# Patient Record
Sex: FEMALE | Race: OTHER | HISPANIC OR LATINO
[De-identification: names, ages, dates, MRNs, and addresses within clinical notes are randomized per-mention and may not be internally consistent; named-entity substitution may affect disease eponyms.]

---

## 2017-12-08 ENCOUNTER — APPOINTMENT (OUTPATIENT)
Dept: OTOLARYNGOLOGY | Facility: CLINIC | Age: 57
End: 2017-12-08

## 2017-12-08 PROBLEM — Z00.00 ENCOUNTER FOR PREVENTIVE HEALTH EXAMINATION: Status: ACTIVE | Noted: 2017-12-08

## 2017-12-29 ENCOUNTER — APPOINTMENT (OUTPATIENT)
Dept: OTOLARYNGOLOGY | Facility: CLINIC | Age: 57
End: 2017-12-29
Payer: MEDICAID

## 2017-12-29 DIAGNOSIS — H61.22 IMPACTED CERUMEN, LEFT EAR: ICD-10-CM

## 2017-12-29 DIAGNOSIS — F20.9 SCHIZOPHRENIA, UNSPECIFIED: ICD-10-CM

## 2017-12-29 DIAGNOSIS — E11.9 TYPE 2 DIABETES MELLITUS W/OUT COMPLICATIONS: ICD-10-CM

## 2017-12-29 DIAGNOSIS — I10 ESSENTIAL (PRIMARY) HYPERTENSION: ICD-10-CM

## 2017-12-29 PROCEDURE — 69210 REMOVE IMPACTED EAR WAX UNI: CPT

## 2017-12-29 PROCEDURE — 99213 OFFICE O/P EST LOW 20 MIN: CPT | Mod: 25

## 2017-12-29 RX ORDER — LORAZEPAM 1 MG/1
1 TABLET ORAL
Refills: 0 | Status: ACTIVE | COMMUNITY

## 2017-12-29 RX ORDER — AMLODIPINE BESYLATE 5 MG/1
5 TABLET ORAL
Refills: 0 | Status: ACTIVE | COMMUNITY

## 2017-12-29 RX ORDER — OLANZAPINE 20 MG/1
20 TABLET, FILM COATED ORAL
Refills: 0 | Status: ACTIVE | COMMUNITY

## 2017-12-29 RX ORDER — DIPHENHYDRAMINE HCL 50 MG/1
50 CAPSULE ORAL
Refills: 0 | Status: ACTIVE | COMMUNITY

## 2017-12-29 RX ORDER — HYDRALAZINE HYDROCHLORIDE 10 MG/1
10 TABLET ORAL
Refills: 0 | Status: ACTIVE | COMMUNITY

## 2017-12-29 RX ORDER — LAMOTRIGINE 200 MG/1
200 TABLET ORAL
Refills: 0 | Status: ACTIVE | COMMUNITY

## 2017-12-29 RX ORDER — PALIPERIDONE 1.5 MG/1
1.5 TABLET, FILM COATED, EXTENDED RELEASE ORAL
Refills: 0 | Status: ACTIVE | COMMUNITY

## 2018-02-12 ENCOUNTER — EMERGENCY (EMERGENCY)
Facility: HOSPITAL | Age: 58
LOS: 0 days | Discharge: HOME | End: 2018-02-13
Attending: EMERGENCY MEDICINE

## 2018-02-12 VITALS
SYSTOLIC BLOOD PRESSURE: 160 MMHG | RESPIRATION RATE: 18 BRPM | TEMPERATURE: 98 F | HEART RATE: 98 BPM | OXYGEN SATURATION: 95 % | DIASTOLIC BLOOD PRESSURE: 74 MMHG

## 2018-02-12 DIAGNOSIS — F20.9 SCHIZOPHRENIA, UNSPECIFIED: ICD-10-CM

## 2018-02-12 DIAGNOSIS — R05 COUGH: ICD-10-CM

## 2018-02-12 DIAGNOSIS — Z88.8 ALLERGY STATUS TO OTHER DRUGS, MEDICAMENTS AND BIOLOGICAL SUBSTANCES STATUS: ICD-10-CM

## 2018-02-12 DIAGNOSIS — E11.9 TYPE 2 DIABETES MELLITUS WITHOUT COMPLICATIONS: ICD-10-CM

## 2018-02-12 DIAGNOSIS — J06.9 ACUTE UPPER RESPIRATORY INFECTION, UNSPECIFIED: ICD-10-CM

## 2018-02-12 DIAGNOSIS — I10 ESSENTIAL (PRIMARY) HYPERTENSION: ICD-10-CM

## 2018-02-13 VITALS
HEART RATE: 90 BPM | RESPIRATION RATE: 17 BRPM | DIASTOLIC BLOOD PRESSURE: 72 MMHG | TEMPERATURE: 98 F | SYSTOLIC BLOOD PRESSURE: 140 MMHG | OXYGEN SATURATION: 98 %

## 2018-02-13 LAB
FLU A RESULT: NEGATIVE — SIGNIFICANT CHANGE UP
FLU A RESULT: NEGATIVE — SIGNIFICANT CHANGE UP
FLUAV AG NPH QL: NEGATIVE — SIGNIFICANT CHANGE UP
FLUBV AG NPH QL: NEGATIVE — SIGNIFICANT CHANGE UP

## 2018-02-13 NOTE — ED PROVIDER NOTE - NS ED ROS FT
Constitutional: see HPI  Cardiac: No chest pain, SOB or edema.  Respiratory: see HPI  GI: No nausea, vomiting, diarrhea or abdominal pain.  : No dysuria, frequency, urgency or hematuria  MS: no pain to back or extremities  Neuro: No headache or weakness. No LOC.  Skin: No skin rash.  Except as documented in the HPI, all other systems are negative.

## 2018-02-13 NOTE — ED PROVIDER NOTE - PROGRESS NOTE DETAILS
flu negative, CXR with no infiltrate, discussed these findings with RN Mat at [facility], will send back to facility. No signs of acute cadiopulmonary disease

## 2018-02-13 NOTE — ED PROVIDER NOTE - PHYSICAL EXAMINATION
VITAL SIGNS: I have reviewed nursing notes and confirm.  CONSTITUTIONAL: Well-developed; well-nourished; in no acute distress.  SKIN: Skin exam is warm and dry, no acute rash.  HEAD: Normocephalic; atraumatic.  EYES: PERRL, EOM intact; conjunctiva and sclera clear.  ENT: clear rhinorrhea, visible post nasal drip, normal appearing pharynx, normal TMs  NECK: Supple; non tender, no adenopathy  CARD: Regular rate and rhythm.  RESP: No wheezes, rales or rhonchi.  ABD: Normal bowel sounds; soft; non-distended; non-tender  EXT: Normal ROM. No clubbing, cyanosis or edema.  NEURO: Alert, oriented. Grossly unremarkable. No focal deficits.  PSYCH: Cooperative, appropriate, occasionally speaks nonsensically but mostly answers questions, denies SI and HI, no apparent hallucinations, psychosis

## 2018-02-13 NOTE — ED PROVIDER NOTE - MEDICAL DECISION MAKING DETAILS
56 yo F, limited historian, here for flu like sx, normal VS and exam in ED and currently no complaints, will check flu swab and CXR as patient is from group facility and would likely need to be isolated if flu positive, will reassess

## 2018-02-13 NOTE — ED PROVIDER NOTE - OBJECTIVE STATEMENT
58 yo F with history of HTN, DM II, schizophrenia, sent from  Psych center after reportedly reporting cough, runny nose, throat pain and fever, though report from  Psych denies fever. In ED, via , patient has no complaints. Does not appear to be disorganized though has poor insight into illness and occasionally speaks nonsensically

## 2018-03-13 ENCOUNTER — EMERGENCY (EMERGENCY)
Facility: HOSPITAL | Age: 58
LOS: 0 days | Discharge: NURSING, REHAB OR LONG-TERM CARE | End: 2018-03-13
Attending: EMERGENCY MEDICINE

## 2018-03-13 VITALS
TEMPERATURE: 98 F | OXYGEN SATURATION: 92 % | DIASTOLIC BLOOD PRESSURE: 89 MMHG | RESPIRATION RATE: 18 BRPM | HEART RATE: 89 BPM | SYSTOLIC BLOOD PRESSURE: 187 MMHG | WEIGHT: 179.9 LBS | HEIGHT: 64 IN

## 2018-03-13 VITALS — TEMPERATURE: 99 F

## 2018-03-13 DIAGNOSIS — Z79.52 LONG TERM (CURRENT) USE OF SYSTEMIC STEROIDS: ICD-10-CM

## 2018-03-13 DIAGNOSIS — F20.9 SCHIZOPHRENIA, UNSPECIFIED: ICD-10-CM

## 2018-03-13 DIAGNOSIS — Z79.84 LONG TERM (CURRENT) USE OF ORAL HYPOGLYCEMIC DRUGS: ICD-10-CM

## 2018-03-13 DIAGNOSIS — E78.5 HYPERLIPIDEMIA, UNSPECIFIED: ICD-10-CM

## 2018-03-13 DIAGNOSIS — Z79.899 OTHER LONG TERM (CURRENT) DRUG THERAPY: ICD-10-CM

## 2018-03-13 DIAGNOSIS — Z79.51 LONG TERM (CURRENT) USE OF INHALED STEROIDS: ICD-10-CM

## 2018-03-13 DIAGNOSIS — Z79.01 LONG TERM (CURRENT) USE OF ANTICOAGULANTS: ICD-10-CM

## 2018-03-13 DIAGNOSIS — F20.3 UNDIFFERENTIATED SCHIZOPHRENIA: ICD-10-CM

## 2018-03-13 DIAGNOSIS — Z88.8 ALLERGY STATUS TO OTHER DRUGS, MEDICAMENTS AND BIOLOGICAL SUBSTANCES STATUS: ICD-10-CM

## 2018-03-13 DIAGNOSIS — I10 ESSENTIAL (PRIMARY) HYPERTENSION: ICD-10-CM

## 2018-03-13 DIAGNOSIS — E11.9 TYPE 2 DIABETES MELLITUS WITHOUT COMPLICATIONS: ICD-10-CM

## 2018-03-13 RX ORDER — LAMOTRIGINE 25 MG/1
0 TABLET, ORALLY DISINTEGRATING ORAL
Qty: 0 | Refills: 0 | COMMUNITY

## 2018-03-13 RX ORDER — OLANZAPINE 15 MG/1
0 TABLET, FILM COATED ORAL
Qty: 0 | Refills: 0 | COMMUNITY

## 2018-03-13 RX ORDER — LACTULOSE 10 G/15ML
20 SOLUTION ORAL
Qty: 0 | Refills: 0 | COMMUNITY

## 2018-03-13 RX ORDER — RISPERIDONE 4 MG/1
0 TABLET ORAL
Qty: 0 | Refills: 0 | COMMUNITY

## 2018-03-13 RX ORDER — SERTRALINE 25 MG/1
2 TABLET, FILM COATED ORAL
Qty: 0 | Refills: 0 | COMMUNITY

## 2018-03-13 RX ORDER — POLYETHYLENE GLYCOL 3350 17 G/17G
0 POWDER, FOR SOLUTION ORAL
Qty: 0 | Refills: 0 | COMMUNITY

## 2018-03-13 RX ORDER — PALIPERIDONE 1.5 MG/1
0 TABLET, EXTENDED RELEASE ORAL
Qty: 0 | Refills: 0 | COMMUNITY

## 2018-03-13 RX ORDER — HYDRALAZINE HCL 50 MG
0 TABLET ORAL
Qty: 0 | Refills: 0 | COMMUNITY

## 2018-03-13 RX ORDER — AMLODIPINE BESYLATE 2.5 MG/1
1 TABLET ORAL
Qty: 0 | Refills: 0 | COMMUNITY

## 2018-03-13 RX ORDER — SITAGLIPTIN 50 MG/1
1 TABLET, FILM COATED ORAL
Qty: 0 | Refills: 0 | COMMUNITY

## 2018-03-13 RX ORDER — CHOLECALCIFEROL (VITAMIN D3) 125 MCG
1 CAPSULE ORAL
Qty: 0 | Refills: 0 | COMMUNITY

## 2018-03-13 NOTE — ED PROVIDER NOTE - PMH
Constipation    Diabetes mellitus    Hyperlipidemia    Hypertension    Orthostatic hypotension    Osteoarthritis    Schizophrenia    Vitamin D deficiency

## 2018-03-13 NOTE — ED BEHAVIORAL HEALTH ASSESSMENT NOTE - DESCRIPTION
Pt was seen and evaluated by ER MD and upon medical clearance was referred for psychiatric evaluation.  Pt was seen and evaluated.  Pt has been calm since arrival up to present time. Pt is not fully cooperative, she was not answering  when triage nurse used  phone access.  Pt seen lying on bed with poor eye contact but was able to verbally respond to questions when asked in [place]ine[place].  Pt was coherent, speech was non- productive.  Pt answers in 1-2 words sentences.  When asked in [place] what her problem, she stated, "No" She was asked if she feels depressed in Hebrew, she stated, "No." when asked in [place] if she hears voices in her head, she answered "No voces." (No voices) and was asked if she has thoughts of wanting to die, she answered, "No quiere morir." (No I don't want to die) and when asked in [place] if she wants to go back to TLR, She answered and nodded. "Si" (yes).  Pt does not pose any substantial risk of harm to self or others at this time and is psychiatrically cleared for discharge back [facility] TLR unit and refer back to [facility] treating psychiatrist continuing follow-up care. Constipation, D.M., HPN, Hyperlipidemia,Osteoarthritis Pt currently disabled and resides at [facility].

## 2018-03-13 NOTE — ED BEHAVIORAL HEALTH ASSESSMENT NOTE - OTHER
Other residents/patients H/o Schizophrenia H/O Schizophrenia Fairly cooperative Neutral did not participate in examination Speaks only in [place] Island[place] Return back to ER PRN

## 2018-03-13 NOTE — ED BEHAVIORAL HEALTH ASSESSMENT NOTE - OTHER PAST PSYCHIATRIC HISTORY (INCLUDE DETAILS REGARDING ONSET, COURSE OF ILLNESS, INPATIENT/OUTPATIENT TREATMENT)
known history of schizophrenia with past psychiatric hospitalizations the last one was at [facility] [place] [facility] and was eventually discharged to [facility] where she has been followed-up by [facility] psychiatrist.

## 2018-03-13 NOTE — ED BEHAVIORAL HEALTH ASSESSMENT NOTE - DETAILS
None available complains of colds as per referral consult from [facility] Case and psychiatric disposition plan

## 2018-03-13 NOTE — ED BEHAVIORAL HEALTH ASSESSMENT NOTE - HPI (INCLUDE ILLNESS QUALITY, SEVERITY, DURATION, TIMING, CONTEXT, MODIFYING FACTORS, ASSOCIATED SIGNS AND SYMPTOMS)
Pt is a 56 y/o  female with known history of schizophrenia with past psychiatric hospitalizations the last one was at [facility] ([facility]) inpatient unit and was eventually discharged to [facility] where she has been followed-up by [facility] psychiatrist maintained on multiple psychotropic medications consisting Risperidone, Lamotrigine, Olanzapine, lorazepam , Sertraline and Paliperidone  IM suspension [id number] and apparently behaviorally stable.  Today, pt was referred to ER from [facility] TLR due to "Pt was complaining of colds and also pt has hallucination," as written on [facility] consult form.  Pt was subsequently referred for psychiatric consultation.

## 2018-03-13 NOTE — ED BEHAVIORAL HEALTH ASSESSMENT NOTE - SUMMARY
Pt is a 56 y/o  female with known history of schizophrenia with past psychiatric hospitalizations the last one was at [facility] ([facility]) inpatient unit and was eventually discharged to [facility] where she has been followed-up by [facility] psychiatrist maintained on multiple psychotropic medications who was BIBA to ER due to "Pt was complaining of colds and also pt has hallucination," as written on [facility] consult form.  Pt presents with negative symptoms such as poor social interaction, fair personal hygiene, Blunted Affect, mood is neutral  with no suicidal or homicidal ideation and appears with anhedonia, thought blocking and poor insight.  No overt delusion and denies hallucination not exhibiting positive symptoms.  Pt meets criteria for Schizophrenia, chronic and does not pose any substantial risk of harm to self or others at this time and is psychiatrically cleared for discharge back [facility] TLR unit and refer back to [facility] treating psychiatrist continuing follow-up care.

## 2018-03-13 NOTE — ED ADULT NURSE NOTE - PMH
Diabetes mellitus    Hypertension    Schizophrenia Constipation    Diabetes mellitus    Hyperlipidemia    Hypertension    Orthostatic hypotension    Osteoarthritis    Schizophrenia    Vitamin D deficiency

## 2018-03-13 NOTE — ED PROVIDER NOTE - ATTENDING CONTRIBUTION TO CARE
[facility] so was sent here. pt at baseline minimally verbal reportedly, on exam s1s2 ctab soft nt/nd, perrl eomi. will discuss with psych

## 2018-03-13 NOTE — ED ADULT NURSE REASSESSMENT NOTE - NS ED NURSE REASSESS COMMENT FT1
Attempted to utilize  phone with no success-pt would not answer  [id number].  Called [facility] 1, spoke with Jhonny RN for more information

## 2018-03-13 NOTE — ED BEHAVIORAL HEALTH ASSESSMENT NOTE - REASON FOR REFERRAL
Pt was BIBA from [facility] due to pt was "Complaining of colds and has hallucination."

## 2018-03-13 NOTE — ED BEHAVIORAL HEALTH ASSESSMENT NOTE - CURRENT MEDICATION
Risperidone, Lamotrigine, Olanzapine, lorazepam , Sertraline and Paliperidone IM Suspension [id number].

## 2018-03-13 NOTE — ED BEHAVIORAL HEALTH ASSESSMENT NOTE - RESIDENCE
[facility] ([facility] (TLR)

## 2018-03-13 NOTE — ED PROVIDER NOTE - OBJECTIVE STATEMENT
58 y/o F prior hx of HTN, anxiety, bipolar, schizophrenia presents with the brief episode of refusal to take her medication per [facility] nurse. Patient also cried at the same time. She is minimally verbal at baseline. No nausea, vomiting. No CP, SOB.

## 2018-05-02 ENCOUNTER — OUTPATIENT (OUTPATIENT)
Dept: OUTPATIENT SERVICES | Facility: HOSPITAL | Age: 58
LOS: 1 days | Discharge: HOME | End: 2018-05-02

## 2018-05-08 ENCOUNTER — EMERGENCY (EMERGENCY)
Facility: HOSPITAL | Age: 58
LOS: 0 days | Discharge: HOME | End: 2018-05-08
Attending: EMERGENCY MEDICINE | Admitting: EMERGENCY MEDICINE

## 2018-05-08 VITALS
HEART RATE: 88 BPM | TEMPERATURE: 96 F | OXYGEN SATURATION: 98 % | SYSTOLIC BLOOD PRESSURE: 137 MMHG | RESPIRATION RATE: 18 BRPM | DIASTOLIC BLOOD PRESSURE: 69 MMHG

## 2018-05-08 DIAGNOSIS — I10 ESSENTIAL (PRIMARY) HYPERTENSION: ICD-10-CM

## 2018-05-08 DIAGNOSIS — F23 BRIEF PSYCHOTIC DISORDER: ICD-10-CM

## 2018-05-08 DIAGNOSIS — R68.83 CHILLS (WITHOUT FEVER): ICD-10-CM

## 2018-05-08 DIAGNOSIS — Z88.8 ALLERGY STATUS TO OTHER DRUGS, MEDICAMENTS AND BIOLOGICAL SUBSTANCES: ICD-10-CM

## 2018-05-08 DIAGNOSIS — E11.9 TYPE 2 DIABETES MELLITUS WITHOUT COMPLICATIONS: ICD-10-CM

## 2018-05-08 DIAGNOSIS — Z79.899 OTHER LONG TERM (CURRENT) DRUG THERAPY: ICD-10-CM

## 2018-05-08 NOTE — ED ADULT TRIAGE NOTE - CHIEF COMPLAINT QUOTE
As per EMS, [place] and told EMS she was cold. Patient states she escaped a locked unit. Patient from [address] [facility]. 1:1 placed on patient.

## 2018-05-08 NOTE — ED PROVIDER NOTE - ATTENDING CONTRIBUTION TO CARE
58 yo F with history of schizophrenia, chronic auditory hallucinations, lives [place] [facility], brought in by EMS after [place], shivering and responding to auditory hallucinations. No SI or HI, no aggressive behavior. The patient was seen by our psych team who obtained collateral information from family and [facility] team who felt that the patient is at her baseline, is generally not a "flight risk" or a behavioral concern. Will return to [place] psych, cleared by Dr. ortega.

## 2018-05-08 NOTE — ED PROVIDER NOTE - OBJECTIVE STATEMENT
58 yo F with PMHx of DM, HTN, HLD, OA and schizophrenia presents to the ED BIB EMS after being found wandering around the beach shivering and responding to auditory hallucinations. Pt is a resident at [facility] and escaped. Pt denies any suicidal or homicidal ideation. Pt denies fever, chills, nausea, vomiting, abdominal pain, diarrhea, headache, dizziness, weakness, chest pain, SOB, back pain, LOC, trauma.

## 2018-05-08 NOTE — ED BEHAVIORAL HEALTH ASSESSMENT NOTE - DESCRIPTION
patient is calm and pleasant Constipation, D.M., HPN, Hyperlipidemia,Osteoarthritis Pt currently resides at [facility]

## 2018-05-08 NOTE — ED PROVIDER NOTE - PHYSICAL EXAMINATION
VITAL SIGNS: I have reviewed nursing notes and confirm.  CONSTITUTIONAL: Well-developed; well-nourished; in no acute distress.  SKIN: Skin exam is warm and dry, no acute rash.  HEAD: Normocephalic; atraumatic.  EYES: Conjunctiva and sclera clear.  ENT: No nasal discharge; airway clear.   NECK: Supple; non tender.  CARD: S1, S2 normal; no murmurs, gallops, or rubs. Regular rate and rhythm.  RESP: No wheezes, rales or rhonchi. Speaking in full sentences.   EXT: Normal ROM.   NEURO: Alert, oriented. Grossly unremarkable. No focal deficits.  PSYCH: Cooperative, appropriate. (+) auditory hallucinations. No suicidal/homicidal ideation.

## 2018-05-08 NOTE — ED BEHAVIORAL HEALTH ASSESSMENT NOTE - CASE SUMMARY
57 year old woman domiciled at [facility] with a past diagnosis of schizophrenia who was found wandering on the beach.  Patient evaluated, chart reviewed, collateral obtained from residence and family.  The patient’s history is notable for past psychiatric hospitalizations including most recent at [facility] ([facility] followed by discharge to [facility] and maintained on multiple antipsychotics including a long acting injectable.  She recently presented to the ED in 3/2018 with much the same MSE notable for negative symptoms of schizophrenia and impaired insight with some thought blocking and thought disorganization.  These signs are of her chronic illness and she is unlikely to achieve full remission.  In the ED under observation, the patient was calm and in behavioral control, denying SI or HI.  At this time, despite her symptomology, she does not appear to be at increased imminent risk to harm herself or others.  Her housing would normally provide the supportive environment necessary to protect against events such as tonight when she has left the residency.  Discharge back to this level of care is appropriate.  Inpatient hospitalization would not likely modify her current risk.

## 2018-05-08 NOTE — ED BEHAVIORAL HEALTH ASSESSMENT NOTE - HPI (INCLUDE ILLNESS QUALITY, SEVERITY, DURATION, TIMING, CONTEXT, MODIFYING FACTORS, ASSOCIATED SIGNS AND SYMPTOMS)
Pt is a 58 y/o  female, domiciled at [facility], with known history of schizophrenia brought in by EMS after she was found on the [place] shivering and she told EMS that she ran away from the hospital. On evaluation with , patient says that she ran away from [facility] for personal reasons, because she doesn't like some woman there. She planned to walk to her mother's house in [place]. She is constricted on interview, lying down on the bed in blankets. She denies AH, VH, PI, SI, HI.       Obtained collateral from [facility] staff member Glenna, who says that Jauna is normally not a behavioral problem. She is compliant with her medications, calm and pleasant. She is not known to run away from [facility]. Also obtained collateral from her sister Jairo, who says that at baseline patient has auditory hallucinations. When she saw her two weeks ago, patient was talking to two little girls (who do not exist). She confirms that patient is not usually a behavioral problem and takes her medications. Jairo lives with their mother in [place]. It was expressed to Jairo that patient will be returned to [facility] and she verbalizes agreement with the plan. [facility] made aware that patient will be returning to them

## 2018-05-08 NOTE — ED BEHAVIORAL HEALTH ASSESSMENT NOTE - CURRENT MEDICATION
Risperidone, Lamotrigine, Olanzapine, lorazepam , Sertraline and Paliperidone IM Suspension [id number].

## 2018-05-08 NOTE — ED BEHAVIORAL HEALTH ASSESSMENT NOTE - REASON FOR REFERRAL
Pt was BIBA from [facility] due to pt was "Complaining of colds and has hallucination." found [place] a beach

## 2018-05-08 NOTE — ED BEHAVIORAL HEALTH ASSESSMENT NOTE - DETAILS
None available complains of colds as per referral consult from [facility] ed

## 2018-05-08 NOTE — ED BEHAVIORAL HEALTH ASSESSMENT NOTE - OTHER
tlr H/o Schizophrenia H/O Schizophrenia Neutral did not participate in examination Speaks only in [place]e Fairly cooperative

## 2018-05-08 NOTE — ED BEHAVIORAL HEALTH ASSESSMENT NOTE - OTHER PAST PSYCHIATRIC HISTORY (INCLUDE DETAILS REGARDING ONSET, COURSE OF ILLNESS, INPATIENT/OUTPATIENT TREATMENT)
known history of schizophrenia with past psychiatric hospitalizations the last one was at [facility] ([facility] inpatient unit and was eventually discharged to [facility] where she has been followed-up by [facility] psychiatrist.

## 2018-05-24 ENCOUNTER — APPOINTMENT (OUTPATIENT)
Dept: UROLOGY | Facility: CLINIC | Age: 58
End: 2018-05-24

## 2018-05-29 ENCOUNTER — APPOINTMENT (OUTPATIENT)
Dept: PODIATRY | Facility: CLINIC | Age: 58
End: 2018-05-29

## 2018-06-15 ENCOUNTER — EMERGENCY (EMERGENCY)
Facility: HOSPITAL | Age: 58
LOS: 1 days | Discharge: HOME | End: 2018-06-15
Attending: EMERGENCY MEDICINE | Admitting: EMERGENCY MEDICINE
Payer: MEDICAID

## 2018-06-15 VITALS
HEART RATE: 95 BPM | SYSTOLIC BLOOD PRESSURE: 172 MMHG | TEMPERATURE: 98 F | RESPIRATION RATE: 18 BRPM | OXYGEN SATURATION: 98 % | DIASTOLIC BLOOD PRESSURE: 99 MMHG

## 2018-06-15 VITALS
RESPIRATION RATE: 16 BRPM | DIASTOLIC BLOOD PRESSURE: 68 MMHG | SYSTOLIC BLOOD PRESSURE: 141 MMHG | HEART RATE: 69 BPM | OXYGEN SATURATION: 97 % | TEMPERATURE: 99 F

## 2018-06-15 DIAGNOSIS — F20.9 SCHIZOPHRENIA, UNSPECIFIED: ICD-10-CM

## 2018-06-15 DIAGNOSIS — I10 ESSENTIAL (PRIMARY) HYPERTENSION: ICD-10-CM
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
## 2018-06-15 DIAGNOSIS — E11.65 TYPE 2 DIABETES MELLITUS WITH HYPERGLYCEMIA: ICD-10-CM

## 2018-06-15 DIAGNOSIS — R41.82 ALTERED MENTAL STATUS, UNSPECIFIED: ICD-10-CM

## 2018-06-15 DIAGNOSIS — E78.5 HYPERLIPIDEMIA, UNSPECIFIED: ICD-10-CM

## 2018-06-15 LAB
ALBUMIN SERPL ELPH-MCNC: 3.5 G/DL — SIGNIFICANT CHANGE UP (ref 3.4–5)
ALP SERPL-CCNC: 113 U/L — SIGNIFICANT CHANGE UP (ref 40–120)
ALT FLD-CCNC: 32 U/L — SIGNIFICANT CHANGE UP (ref 12–42)
ANION GAP SERPL CALC-SCNC: 9 MMOL/L — SIGNIFICANT CHANGE UP (ref 9–16)
APPEARANCE UR: CLEAR — SIGNIFICANT CHANGE UP
AST SERPL-CCNC: 24 U/L — SIGNIFICANT CHANGE UP (ref 15–37)
BILIRUB SERPL-MCNC: 0.2 MG/DL — SIGNIFICANT CHANGE UP (ref 0.2–1.2)
BILIRUB UR-MCNC: NEGATIVE — SIGNIFICANT CHANGE UP
BUN SERPL-MCNC: 40 MG/DL — HIGH (ref 7–23)
CALCIUM SERPL-MCNC: 9.8 MG/DL — SIGNIFICANT CHANGE UP (ref 8.5–10.5)
CHLORIDE SERPL-SCNC: 103 MMOL/L — SIGNIFICANT CHANGE UP (ref 96–108)
CO2 SERPL-SCNC: 25 MMOL/L — SIGNIFICANT CHANGE UP (ref 22–31)
COLOR SPEC: YELLOW — SIGNIFICANT CHANGE UP
CREAT SERPL-MCNC: 1.44 MG/DL — HIGH (ref 0.5–1.3)
DIFF PNL FLD: ABNORMAL
ETHANOL SERPL-MCNC: <3 MG/DL — SIGNIFICANT CHANGE UP
GLUCOSE SERPL-MCNC: 438 MG/DL — HIGH (ref 70–99)
GLUCOSE UR QL: >=1000
HCT VFR BLD CALC: 35.7 % — SIGNIFICANT CHANGE UP (ref 34.5–45)
HGB BLD-MCNC: 11.7 G/DL — SIGNIFICANT CHANGE UP (ref 11.5–15.5)
KETONES UR-MCNC: NEGATIVE — SIGNIFICANT CHANGE UP
LEUKOCYTE ESTERASE UR-ACNC: NEGATIVE — SIGNIFICANT CHANGE UP
MCHC RBC-ENTMCNC: 29 PG — SIGNIFICANT CHANGE UP (ref 27–34)
MCHC RBC-ENTMCNC: 32.8 G/DL — SIGNIFICANT CHANGE UP (ref 32–36)
MCV RBC AUTO: 88.4 FL — SIGNIFICANT CHANGE UP (ref 80–100)
NITRITE UR-MCNC: NEGATIVE — SIGNIFICANT CHANGE UP
PCO2 BLDV: 48 MMHG — SIGNIFICANT CHANGE UP (ref 41–51)
PH BLDV: 7.34 — SIGNIFICANT CHANGE UP (ref 7.32–7.43)
PH UR: 6.5 — SIGNIFICANT CHANGE UP (ref 5–8)
PLATELET # BLD AUTO: 260 K/UL — SIGNIFICANT CHANGE UP (ref 150–400)
PO2 BLDV: 27 MMHG — LOW (ref 35–40)
POTASSIUM SERPL-MCNC: 5 MMOL/L — SIGNIFICANT CHANGE UP (ref 3.5–5.3)
POTASSIUM SERPL-SCNC: 5 MMOL/L — SIGNIFICANT CHANGE UP (ref 3.5–5.3)
PROT SERPL-MCNC: 8.3 G/DL — HIGH (ref 6.4–8.2)
PROT UR-MCNC: ABNORMAL MG/DL
RBC # BLD: 4.04 M/UL — SIGNIFICANT CHANGE UP (ref 3.8–5.2)
RBC # FLD: 12.9 % — SIGNIFICANT CHANGE UP (ref 10.3–16.9)
SAO2 % BLDV: 50 % — SIGNIFICANT CHANGE UP
SODIUM SERPL-SCNC: 137 MMOL/L — SIGNIFICANT CHANGE UP (ref 132–145)
SP GR SPEC: 1.02 — SIGNIFICANT CHANGE UP (ref 1–1.03)
UROBILINOGEN FLD QL: 0.2 E.U./DL — SIGNIFICANT CHANGE UP
WBC # BLD: 9.1 K/UL — SIGNIFICANT CHANGE UP (ref 3.8–10.5)
WBC # FLD AUTO: 9.1 K/UL — SIGNIFICANT CHANGE UP (ref 3.8–10.5)

## 2018-06-15 PROCEDURE — 99284 EMERGENCY DEPT VISIT MOD MDM: CPT

## 2018-06-15 RX ORDER — INSULIN HUMAN 100 [IU]/ML
10 INJECTION, SOLUTION SUBCUTANEOUS ONCE
Qty: 0 | Refills: 0 | Status: COMPLETED | OUTPATIENT
Start: 2018-06-15 | End: 2018-06-15

## 2018-06-15 RX ORDER — SODIUM CHLORIDE 9 MG/ML
1000 INJECTION INTRAMUSCULAR; INTRAVENOUS; SUBCUTANEOUS ONCE
Qty: 0 | Refills: 0 | Status: COMPLETED | OUTPATIENT
Start: 2018-06-15 | End: 2018-06-15

## 2018-06-15 RX ADMIN — SODIUM CHLORIDE 2000 MILLILITER(S): 9 INJECTION INTRAMUSCULAR; INTRAVENOUS; SUBCUTANEOUS at 17:05

## 2018-06-15 RX ADMIN — SODIUM CHLORIDE 2000 MILLILITER(S): 9 INJECTION INTRAMUSCULAR; INTRAVENOUS; SUBCUTANEOUS at 18:13

## 2018-06-15 RX ADMIN — INSULIN HUMAN 10 UNIT(S): 100 INJECTION, SOLUTION SUBCUTANEOUS at 18:11

## 2018-06-15 NOTE — ED ADULT NURSE NOTE - CHPI ED SYMPTOMS NEG
no disorientation/no suicidal/no weakness/no hallucinations/no homicidal/no change in level of consciousness/no agitation/no confusion/no weight loss

## 2018-06-15 NOTE — ED PROVIDER NOTE - PROGRESS NOTE DETAILS
was able to locate patient's residence in [place].  We spoke with them and they are expecting patient.   They state she is known to wander and they are looking to increase her level of housing.  Patient is awake and conversant.  She has no complaints.  She is ambulatory and taking po without difficulty.  Her blood sugar was high but responded to insulin.  She is now ready for discharge.  EMS called to safely transport patient back to her facility.

## 2018-06-15 NOTE — ED PROVIDER NOTE - MEDICAL DECISION MAKING DETAILS
Will treat hyperglycemia. Contacted [facility] and confirm that Alyssa Wilcox is a resident. Patient will be transported home via ambulance.

## 2018-06-15 NOTE — ED ADULT NURSE NOTE - CHIEF COMPLAINT QUOTE
pt found on street crying, and somnolent Rwandan speaking refusing to talk to EMS. in triage pt awake alert and oriented to self and place and month. states she is from a psych facility in [place]. denies pain/headache ambulatory at scene. face is symmetrical speech is clear

## 2018-06-15 NOTE — ED ADULT NURSE NOTE - OBJECTIVE STATEMENT
Pt BIBA, found altered in the street crying, French speaking only. In triage pt is A&O x self, month and place, pt reports she lives in psychiatric facility      t found on street crying, and somnolent French speaking refusing to talk to EMS. in triage pt awake alert and oriented to self and place and month. states she is from a psych facility in [place]. denies pain/headache ambulatory at scene. face is symmetrical speech is clear Pt BIBA, found altered in the street crying, Urdu speaking only. In triage pt is A&O x self, month and place, pt reports she lives in psychiatric facility in [place], does not answer questions related to how she got here. Pt is speaking in complete sentences but random topics, refers to living in [place] with her mother and 'computer screens watching her'. Pt denies any pain currently, no injuries noted. Pt has equal facial symmetry, no facial droop or slurred words, equal strengths and resistance bilaterally. Pt denies any SI/HI, denies any hallucinations.

## 2018-06-15 NOTE — ED ADULT TRIAGE NOTE - CHIEF COMPLAINT QUOTE
pt found on street crying, and somnolent Luxembourger speaking refusing to talk to EMS. in triage pt awake alert and oriented to self and place and month. states she is from a psych facility in [place]. denies pain/headache ambulatory at scene. face is symmetrical speech is clear

## 2018-06-15 NOTE — ED PROVIDER NOTE - OBJECTIVE STATEMENT
57 y/o F w/ PMH DM, HTN, HLD, OA, schizophrenia, was BIB EMS for altered mental status. The only detail patient gave the historian was that "I live on [place] in a psych hospital." Otherwise, patient has no complaints. History limited due to altered mental status.    Reviewed previus charts. Patient was seen at [facility] on 5/10/18 after being found wandering around the beach and shivering and responding to auditory hallucinations. She is a resident of [facility]. She was evaluated by psych and corroborated sotry with family, who felt patient was at baseline and that she is generally not a "flight risk" and had no behavior concern. She was returned to [facility].

## 2018-06-15 NOTE — ED ADULT NURSE REASSESSMENT NOTE - NS ED NURSE REASSESS COMMENT FT1
As per , pt resides at [facility] ([address] [place] [address] [phone]), an unlocked psychiatric long term stay facility. As per  Luis Carlos, pt did this once before in the past and was found on the beach and taken to [facility]. As per MD, pt will await transport back to facility. Pt remains sleeping in bed, in NAD.

## 2018-06-16 ENCOUNTER — EMERGENCY (EMERGENCY)
Facility: HOSPITAL | Age: 58
LOS: 0 days | Discharge: PSYCHIATRIC OR SUBSTANCE USE FACILITY | End: 2018-06-17
Attending: EMERGENCY MEDICINE | Admitting: EMERGENCY MEDICINE

## 2018-06-16 VITALS
SYSTOLIC BLOOD PRESSURE: 124 MMHG | HEART RATE: 74 BPM | RESPIRATION RATE: 18 BRPM | DIASTOLIC BLOOD PRESSURE: 70 MMHG | OXYGEN SATURATION: 99 % | TEMPERATURE: 98 F

## 2018-06-16 DIAGNOSIS — Z79.2 LONG TERM (CURRENT) USE OF ANTIBIOTICS: ICD-10-CM

## 2018-06-16 DIAGNOSIS — E11.9 TYPE 2 DIABETES MELLITUS WITHOUT COMPLICATIONS: ICD-10-CM

## 2018-06-16 DIAGNOSIS — R44.0 AUDITORY HALLUCINATIONS: ICD-10-CM

## 2018-06-16 DIAGNOSIS — Z79.52 LONG TERM (CURRENT) USE OF SYSTEMIC STEROIDS: ICD-10-CM

## 2018-06-16 DIAGNOSIS — R45.1 RESTLESSNESS AND AGITATION: ICD-10-CM

## 2018-06-16 DIAGNOSIS — E78.4 OTHER HYPERLIPIDEMIA: ICD-10-CM

## 2018-06-16 DIAGNOSIS — E78.5 HYPERLIPIDEMIA, UNSPECIFIED: ICD-10-CM

## 2018-06-16 DIAGNOSIS — R41.0 DISORIENTATION, UNSPECIFIED: ICD-10-CM

## 2018-06-16 DIAGNOSIS — I10 ESSENTIAL (PRIMARY) HYPERTENSION: ICD-10-CM

## 2018-06-16 DIAGNOSIS — Z79.01 LONG TERM (CURRENT) USE OF ANTICOAGULANTS: ICD-10-CM

## 2018-06-16 DIAGNOSIS — Z79.899 OTHER LONG TERM (CURRENT) DRUG THERAPY: ICD-10-CM

## 2018-06-16 DIAGNOSIS — Z88.8 ALLERGY STATUS TO OTHER DRUGS, MEDICAMENTS AND BIOLOGICAL SUBSTANCES: ICD-10-CM

## 2018-06-16 LAB
ALBUMIN SERPL ELPH-MCNC: 3.7 G/DL — SIGNIFICANT CHANGE UP (ref 3.5–5.2)
ALP SERPL-CCNC: 97 U/L — SIGNIFICANT CHANGE UP (ref 30–115)
ALT FLD-CCNC: 19 U/L — SIGNIFICANT CHANGE UP (ref 0–41)
ANION GAP SERPL CALC-SCNC: 14 MMOL/L — SIGNIFICANT CHANGE UP (ref 7–14)
APPEARANCE UR: CLEAR — SIGNIFICANT CHANGE UP
AST SERPL-CCNC: 19 U/L — SIGNIFICANT CHANGE UP (ref 0–41)
BASOPHILS # BLD AUTO: 0.02 K/UL — SIGNIFICANT CHANGE UP (ref 0–0.2)
BASOPHILS NFR BLD AUTO: 0.2 % — SIGNIFICANT CHANGE UP (ref 0–1)
BILIRUB SERPL-MCNC: 0.2 MG/DL — SIGNIFICANT CHANGE UP (ref 0.2–1.2)
BILIRUB UR-MCNC: NEGATIVE — SIGNIFICANT CHANGE UP
BUN SERPL-MCNC: 33 MG/DL — HIGH (ref 10–20)
CALCIUM SERPL-MCNC: 8.9 MG/DL — SIGNIFICANT CHANGE UP (ref 8.5–10.1)
CHLORIDE SERPL-SCNC: 104 MMOL/L — SIGNIFICANT CHANGE UP (ref 98–110)
CK SERPL-CCNC: 212 U/L — SIGNIFICANT CHANGE UP (ref 0–225)
CO2 SERPL-SCNC: 21 MMOL/L — SIGNIFICANT CHANGE UP (ref 17–32)
COLOR SPEC: YELLOW — SIGNIFICANT CHANGE UP
CREAT SERPL-MCNC: 1.4 MG/DL — SIGNIFICANT CHANGE UP (ref 0.7–1.5)
DIFF PNL FLD: NEGATIVE — SIGNIFICANT CHANGE UP
EOSINOPHIL # BLD AUTO: 0.21 K/UL — SIGNIFICANT CHANGE UP (ref 0–0.7)
EOSINOPHIL NFR BLD AUTO: 2.5 % — SIGNIFICANT CHANGE UP (ref 0–8)
GAS PNL BLDV: SIGNIFICANT CHANGE UP
GLUCOSE SERPL-MCNC: 195 MG/DL — HIGH (ref 70–99)
GLUCOSE UR QL: NEGATIVE MG/DL — SIGNIFICANT CHANGE UP
HCT VFR BLD CALC: 32.7 % — LOW (ref 37–47)
HGB BLD-MCNC: 10.7 G/DL — LOW (ref 12–16)
IMM GRANULOCYTES NFR BLD AUTO: 0.7 % — HIGH (ref 0.1–0.3)
KETONES UR-MCNC: NEGATIVE — SIGNIFICANT CHANGE UP
LACTATE SERPL-SCNC: 0.7 MMOL/L — SIGNIFICANT CHANGE UP (ref 0.5–2.2)
LEUKOCYTE ESTERASE UR-ACNC: NEGATIVE — SIGNIFICANT CHANGE UP
LYMPHOCYTES # BLD AUTO: 1.86 K/UL — SIGNIFICANT CHANGE UP (ref 1.2–3.4)
LYMPHOCYTES # BLD AUTO: 21.9 % — SIGNIFICANT CHANGE UP (ref 20.5–51.1)
MCHC RBC-ENTMCNC: 28.3 PG — SIGNIFICANT CHANGE UP (ref 27–31)
MCHC RBC-ENTMCNC: 32.7 G/DL — SIGNIFICANT CHANGE UP (ref 32–37)
MCV RBC AUTO: 86.5 FL — SIGNIFICANT CHANGE UP (ref 81–99)
MONOCYTES # BLD AUTO: 0.75 K/UL — HIGH (ref 0.1–0.6)
MONOCYTES NFR BLD AUTO: 8.8 % — SIGNIFICANT CHANGE UP (ref 1.7–9.3)
NEUTROPHILS # BLD AUTO: 5.58 K/UL — SIGNIFICANT CHANGE UP (ref 1.4–6.5)
NEUTROPHILS NFR BLD AUTO: 65.9 % — SIGNIFICANT CHANGE UP (ref 42.2–75.2)
NITRITE UR-MCNC: NEGATIVE — SIGNIFICANT CHANGE UP
PH UR: 6 — SIGNIFICANT CHANGE UP (ref 5–8)
PLATELET # BLD AUTO: 249 K/UL — SIGNIFICANT CHANGE UP (ref 130–400)
POTASSIUM SERPL-MCNC: 4.7 MMOL/L — SIGNIFICANT CHANGE UP (ref 3.5–5)
POTASSIUM SERPL-SCNC: 4.7 MMOL/L — SIGNIFICANT CHANGE UP (ref 3.5–5)
PROT SERPL-MCNC: 6.7 G/DL — SIGNIFICANT CHANGE UP (ref 6–8)
PROT UR-MCNC: 30 MG/DL
RBC # BLD: 3.78 M/UL — LOW (ref 4.2–5.4)
RBC # FLD: 13.2 % — SIGNIFICANT CHANGE UP (ref 11.5–14.5)
SODIUM SERPL-SCNC: 139 MMOL/L — SIGNIFICANT CHANGE UP (ref 135–146)
SP GR SPEC: 1.01 — SIGNIFICANT CHANGE UP (ref 1.01–1.03)
TROPONIN T SERPL-MCNC: <0.01 NG/ML — SIGNIFICANT CHANGE UP
UROBILINOGEN FLD QL: 0.2 MG/DL — SIGNIFICANT CHANGE UP (ref 0.2–0.2)
WBC # BLD: 8.48 K/UL — SIGNIFICANT CHANGE UP (ref 4.8–10.8)
WBC # FLD AUTO: 8.48 K/UL — SIGNIFICANT CHANGE UP (ref 4.8–10.8)

## 2018-06-16 RX ORDER — HALOPERIDOL DECANOATE 100 MG/ML
0.5 INJECTION INTRAMUSCULAR EVERY 6 HOURS
Qty: 0 | Refills: 0 | Status: DISCONTINUED | OUTPATIENT
Start: 2018-06-16 | End: 2018-06-16

## 2018-06-16 RX ADMIN — Medication 4 MILLIGRAM(S): at 20:31

## 2018-06-16 NOTE — ED BEHAVIORAL HEALTH ASSESSMENT NOTE - DESCRIPTION
Unremarkable, patient calm and sleeping on stretcher Constipation, D.M., HTN, Hyperlipidemia, Osteoarthritis [place]oved [place] from [place] 3 years ago. Pt currently resides at [place] [id number]

## 2018-06-16 NOTE — ED PROVIDER NOTE - SHIFT CHANGE DETAILS
A 59 y/o f w/ pmhx of DM, HTN, DLD, schizophrenia from [facility] as confirmed by psych resident who saw pt presents after being found wandering around street, evaluated by psych, pt with no focal deficits on exam, labs sent, pending urine and ct head, plan was to send back to [facility] as discussed with them by previous team if meidcally cleared, family aware and agree.

## 2018-06-16 NOTE — ED ADULT NURSE NOTE - OBJECTIVE STATEMENT
pt found wandering the streets and brought in by EMS- pt confused. Pt states she lives in [place] and left the hospital yesterday- once she goes home she cont to hear voices. Pt denies any suicidal/ homicidal behavior.

## 2018-06-16 NOTE — ED PROVIDER NOTE - OBJECTIVE STATEMENT
57 yo F pmh of DM, HTN, HLD, OA, schizophrenia was found wandering on the street and brought in by EMS. Patient is found to be confused. States that she is from [place], lives in a hospital but unsure where. does not have any pain at this time. States that she is hearing auditory hallucinations. no cp, no sob, no n/v/d, no abdominal pain.

## 2018-06-16 NOTE — ED BEHAVIORAL HEALTH ASSESSMENT NOTE - CURRENT MEDICATION
Risperidone 1mg qHS, Lamotrigine 200mg qHS, Olanzapine 20mg qHS, lorazepam 1.5mg TID, Sertraline 200mg Qdaily, and Paliperidone IM Suspension [id number], last given 6/12/18

## 2018-06-16 NOTE — ED BEHAVIORAL HEALTH ASSESSMENT NOTE - SUMMARY
Pt is a 56 y/o  female with known history of schizophrenia domiciled at [facility], brought by EMS after she was found wandering on the streets. On evaluation, patient is disorganized in thought process but calm and cooperative. Per collateral, she has AH and delusions at baseline, however she is compliant with medications and in behavioral control. As patient appears at her baseline and is not showing symptoms of acute psychosis, mark or depression, there is not indication for admission to [facility].

## 2018-06-16 NOTE — ED PROVIDER NOTE - PROGRESS NOTE DETAILS
Discussed with psychiatry will come to assess patient patient assessed by psychiatry and cleared to return to [facility] Pt resting comfortably now, answers simple questions. Awaiting CT, urine. Endorsed to Dr Bazan to f/u and transfer to [facility] if neg. pt not allergic to ativan was too combative to obtain ct head, given ativan and obtained ct, urine being obtained now and radiology called for cxr, will continue to monitor and reassess. pt has been resting comfortably I nad, reports no symptoms, brett speakalfieg, nurse translated as comfortable with pt, imaging and urine reviewed, pt was not willing to sit up for proper cxr to be done but not infitlrates noted, pt intermittently sleepting as pt reeived ativan. pulse ox 96 RA.

## 2018-06-16 NOTE — ED BEHAVIORAL HEALTH ASSESSMENT NOTE - OTHER PAST PSYCHIATRIC HISTORY (INCLUDE DETAILS REGARDING ONSET, COURSE OF ILLNESS, INPATIENT/OUTPATIENT TREATMENT)
Known history of schizophrenia, last psychiatric hospitalization at [facility] ([facility] inpatient unit and was eventually discharged to [facility] in January where she has been followed-up by [facility] psychiatrist, Dr. Chris

## 2018-06-16 NOTE — ED PROVIDER NOTE - MEDICAL DECISION MAKING DETAILS
all results reviewed and understood copy of results given to anika gunderson to [facility],  pt with cooperative at this time, all results reviewed.

## 2018-06-16 NOTE — ED BEHAVIORAL HEALTH ASSESSMENT NOTE - RISK ASSESSMENT
Pt is low risk. She denies any suicidal or homicidal ideation at present, has no prior suicidal history.

## 2018-06-16 NOTE — ED PROVIDER NOTE - ATTENDING CONTRIBUTION TO CARE
57yo woman h/o schizophrenia BIB after being found wandering on the street and bystander called EMS. Pt is agitated and a poor historian even with translation from Yakut by nurse Angie. Pt reports that she lives in [place] but can't specify where, can not explain why she is in . She reports auditory hallucinations but denies suicidal or homicidal ideation. She is oriented x 1-2. VS, exam as noted. Labs, tox, head CT to assess for organic cause, psych eval.

## 2018-06-16 NOTE — ED BEHAVIORAL HEALTH ASSESSMENT NOTE - OTHER
Fairly cooperative did not participate in examination Speaks only in Irish TLR H/o Schizophrenia H/O Schizophrenia

## 2018-06-16 NOTE — ED BEHAVIORAL HEALTH ASSESSMENT NOTE - HPI (INCLUDE ILLNESS QUALITY, SEVERITY, DURATION, TIMING, CONTEXT, MODIFYING FACTORS, ASSOCIATED SIGNS AND SYMPTOMS)
Pt is a 56 y/o  female, single, unemployed, domiciled at [facility], with known history of schizophrenia brought in by EMS after she was found wandering on the streets confused.     Collateral from [place] staff member Alisha ([phone]): Nurse reports that Alyssa was found wandering two days ago and ended up in [facility] where she was sent back to [facility]. Prior to that, she ended up at [facility] and was sent back to [facility] from there as well. While at [facility], Jauna is normally not a behavioral problem. She is compliant with her medications, calm and pleasant.   Collateral from her sister Jairo Hernandez ([phone]): Sister reports that patient has AH at baseline and even when she was in [place], she would often wander off. She visited the patient last thursday and tried to tell her to not leave [facility], but patient stated that the voices tell her to leave and that's why she does. She confirms that patient is not usually a behavioral problem and takes her medications, however, she now is worried as the patient keeps wandering off. She, however, doesn't want her to be in a locked unit. Pt is a 56 y/o  female, single, unemployed, domiciled at [facility] 3, with known history of schizophrenia brought in by EMS after she was found wandering on the streets confused.     Collateral from [place] staff member Alisha ([phone]): Nurse reports that Alyssa was found wandering two days ago and ended up in [place], from where she was sent back to [facility]. Prior to that, she ended up at [facility] and was sent back to [facility] from there as well. While at [facility], Jauna is normally not a behavioral problem. She is compliant with her medications, calm and pleasant.   Collateral from her sister Jairo Hernandez ([phone]): Sister reports that patient has AH at baseline and even when she was in [place], she would often wander off. She visited the patient last thursday and tried to tell her to not leave [facility], but patient stated that the voices tell her to leave and that's why she does. She confirms that patient is not usually a behavioral problem and takes her medications, however, she now is worried as the patient keeps wandering off. She, however, doesn't want her to be in a locked unit.

## 2018-06-16 NOTE — ED PROVIDER NOTE - PHYSICAL EXAMINATION
CONSTITUTIONAL: Well-developed; well-nourished; in no acute distress.   SKIN: warm, dry  HEAD: Normocephalic; atraumatic.  EYES: PERRL, no conjunctival erythema  ENT: No nasal discharge; airway clear.  NECK: Supple; non tender.  CARD: S1, S2 normal;  Regular rate and rhythm.   RESP: No wheezes, rales or rhonchi.  ABD: soft ntnd  EXT: Normal ROM.    LYMPH: No acute cervical adenopathy.  NEURO: A&Ox2  PSYCH: limited affect, no eye contact, + auditory hallucinations, no suicidal or homicidal ideations

## 2018-06-17 VITALS
DIASTOLIC BLOOD PRESSURE: 76 MMHG | RESPIRATION RATE: 18 BRPM | SYSTOLIC BLOOD PRESSURE: 162 MMHG | HEART RATE: 79 BPM | OXYGEN SATURATION: 95 % | TEMPERATURE: 96 F

## 2018-06-18 LAB
CULTURE RESULTS: SIGNIFICANT CHANGE UP
SPECIMEN SOURCE: SIGNIFICANT CHANGE UP

## 2018-06-25 ENCOUNTER — EMERGENCY (EMERGENCY)
Facility: HOSPITAL | Age: 58
LOS: 0 days | Discharge: HOME | End: 2018-06-25
Attending: EMERGENCY MEDICINE | Admitting: EMERGENCY MEDICINE

## 2018-06-25 VITALS
DIASTOLIC BLOOD PRESSURE: 70 MMHG | SYSTOLIC BLOOD PRESSURE: 179 MMHG | OXYGEN SATURATION: 97 % | HEART RATE: 72 BPM | RESPIRATION RATE: 18 BRPM | TEMPERATURE: 97 F

## 2018-06-25 VITALS
DIASTOLIC BLOOD PRESSURE: 67 MMHG | OXYGEN SATURATION: 96 % | HEART RATE: 83 BPM | RESPIRATION RATE: 16 BRPM | SYSTOLIC BLOOD PRESSURE: 146 MMHG | TEMPERATURE: 97 F

## 2018-06-25 DIAGNOSIS — Z88.8 ALLERGY STATUS TO OTHER DRUGS, MEDICAMENTS AND BIOLOGICAL SUBSTANCES: ICD-10-CM

## 2018-06-25 DIAGNOSIS — I10 ESSENTIAL (PRIMARY) HYPERTENSION: ICD-10-CM

## 2018-06-25 DIAGNOSIS — E11.9 TYPE 2 DIABETES MELLITUS WITHOUT COMPLICATIONS: ICD-10-CM

## 2018-06-25 DIAGNOSIS — F20.9 SCHIZOPHRENIA, UNSPECIFIED: ICD-10-CM

## 2018-06-25 DIAGNOSIS — Z79.899 OTHER LONG TERM (CURRENT) DRUG THERAPY: ICD-10-CM

## 2018-06-25 DIAGNOSIS — E78.5 HYPERLIPIDEMIA, UNSPECIFIED: ICD-10-CM

## 2018-06-25 RX ORDER — IBUPROFEN 200 MG
800 TABLET ORAL ONCE
Qty: 0 | Refills: 0 | Status: COMPLETED | OUTPATIENT
Start: 2018-06-25 | End: 2018-06-25

## 2018-06-25 RX ADMIN — Medication 800 MILLIGRAM(S): at 18:21

## 2018-06-25 NOTE — ED ADULT NURSE NOTE - CHIEF COMPLAINT QUOTE
BIBA from outside, pt found [facility], pt is resident of [address] [place] [address]

## 2018-06-25 NOTE — ED BEHAVIORAL HEALTH ASSESSMENT NOTE - CURRENT MEDICATION
Risperidone 1mg qHS, Lamotrigine 200mg qHS, Olanzapine 20mg qHS, lorazepam 1.5mg TID, Sertraline 200mg Qdaily, and Paliperidone IM Suspension [id number], last given 6/12/18

## 2018-06-25 NOTE — ED BEHAVIORAL HEALTH ASSESSMENT NOTE - SUMMARY
Pt is a 58 y/o  female with known history of schizophrenia domiciled at [facility], brought by EMS after she was found wandering on the streets. On evaluation, patient is disorganized in thought process but calm and cooperative. Per collateral, she has AH and delusions at baseline, however she is compliant with medications and in behavioral control. As patient appears at her baseline and is not showing symptoms of acute psychosis, mark or depression, there is not indication for admission to IPP. Furthermore, she is requesting to be sent back to [facility], and upon obtaining collateral from [facility], they have no objection to patient returning. Pt is a 56 y/o  female with known history of schizophrenia domiciled at [facility], brought by EMS after she was found wandering on the streets. Patient has had prior similar presentations, most recent on 6/16 at [facility]. On evaluation, patient is disorganized in thought process but calm and cooperative. Per collateral, she has AH and delusions at baseline, however she is compliant with medications and in behavioral control. As patient appears at her baseline and is not showing symptoms of acute psychosis, mark or depression, there is no indication for admission to IPP. Although there is no overt evidence of Dementia, work up of dementia could possibly be beneficial as wandering is often one of the first symptoms - especially if this continues to occur more frequently. Currently, she is requesting to be sent back to [facility], and upon obtaining collateral from [facility], they have no objection to patient returning.

## 2018-06-25 NOTE — ED BEHAVIORAL HEALTH ASSESSMENT NOTE - OTHER
H/o Schizophrenia H/O Schizophrenia Fairly cooperative did not participate in examination Speaks only in [place] Islander

## 2018-06-25 NOTE — ED BEHAVIORAL HEALTH ASSESSMENT NOTE - HPI (INCLUDE ILLNESS QUALITY, SEVERITY, DURATION, TIMING, CONTEXT, MODIFYING FACTORS, ASSOCIATED SIGNS AND SYMPTOMS)
Pt is a 58 y/o  female, single, unemployed, domiciled at [facility] 3, with known history of schizophrenia brought in by EMS after she was found wandering on the streets. Patient has had multiple presentations for the same complaint.      Patient is known to writer from prior presentation 2 weeks ago. Patient upon approach is calm, smiling pleasantly. Patient reports that she left [facility] this morning "for nothing" but wants to go back. She reports that she told the  who found her wandering the streets that she wants to go back to "[address] [place] [address]" but he brought her here. When asked if she knows where "here" is, she states "[facility]". When asked why she keeps leaving [facility], she just smiles. When asked if she's afraid of anything there, she denies it. When asked if she hears voices when alone, she says not now, but admits that the voices sometimes tell her to leave. She denies symptoms suggestive of MDD or mark. Denies SI/HI. When asked about her knee pain, she reports that it's been ongoing for 3 years, denies that she wants to check them-stating she wants to go back to her residence.     Collateral from [place] staff member ([phone]): Nurse reports that Alyssa often wanders off, ends up in an ED, and then is brought back to [facility]. Last time was a week ago at a hospital in [place]. While at [facility], Alyssa is normally not a behavioral problem. She is compliant with her medications, calm and pleasant. Follows with Dr. Chris. They confirmed that she would follow up with him tomorrow, as she is due for her Paliperidone IM shot tomorrow, 6/26/18. No objections to patient returning back to [facility].     Collateral from her sister Jairo Hernandez ([phone]): Unavailable, however, writer spoke to her on previous patient presentation and sister confirmed that she has AH at baseline and has a h/o wandering seven when in [place]. As per TLR, they notified her this morning that her sister had wandered off again. Pt is a 56 y/o  female, single, unemployed, domiciled at [facility], with known history of schizophrenia brought in by EMS after she was found wandering on the streets. Patient has had multiple presentations for the same complaint.    Patient speaks only Trinidadian however writer is conversant in Salvadorean.   Patient is known to writer from prior presentation 2 weeks ago. Patient upon approach is calm, smiling pleasantly. Patient reports that she left [facility] this morning "for nothing" but wants to go back. She reports that she told the  who found her wandering the streets that she wants to go back to "[address] [place] [address]" but he brought her here. When asked if she knows where "here" is, she states "[facility]". When asked why she keeps leaving [facility], she just smiles. When asked if she's afraid of anything there, she denies it. When asked if she hears voices when alone, she says not now, but admits that the voices sometimes tell her to leave. She denies symptoms suggestive of MDD or mark. Denies SI/HI. When asked about her knee pain, she reports that it's been ongoing for 3 years, denies that she wants to check them-stating she wants to go back to her residence.     Collateral from [place] staff member ([phone]): Nurse reports that Alyssa often wanders off, ends up in an ED, and then is brought back to [facility]. Last time was a week ago at a hospital in [place]. While at [facility], Alyssa is normally not a behavioral problem. She is compliant with her medications, calm and pleasant. Follows with Dr. Chris. They confirmed that she would follow up with him tomorrow, as she is due for her Paliperidone IM shot tomorrow, 6/26/18. No objections to patient returning back to [facility].     Collateral from her sister Jairo Hernandez ([phone]): Unavailable, however, writer spoke to her on previous patient presentation and sister confirmed that she has AH at baseline and has a h/o wandering seven when in [place]. As per TLR, they notified her this morning that her sister had wandered off again.

## 2018-06-25 NOTE — ED PROVIDER NOTE - PROGRESS NOTE DETAILS
Patient seen in early May and on 6/16 for similar presentation. Workup negative and she was cleared by psych for discharge Dr. Guerrier to see Psych cleared for discharge. They are recommending outpatient dementia workup

## 2018-06-25 NOTE — ED BEHAVIORAL HEALTH ASSESSMENT NOTE - RISK ASSESSMENT
Patient is at chronically elevated risk given her mental illness, however She denies any suicidal or homicidal ideation at present, has no prior suicidal history. Patient is at chronically elevated risk given her mental illness, however this risk is mitigated by residing at a psychiatric residence, medication compliance, lack of suicidal or homicidal ideations at present, and no prior suicidal history. Patient is not considered an imminent risk to herself or others.

## 2018-06-25 NOTE — ED PROVIDER NOTE - ATTENDING CONTRIBUTION TO CARE
59 y/o female with hx of schizophrenia, lives at [facility] in [facility]. Was found wandering outside today. No medical complaints. No hallucinations. No suicidal/homicidal ideations. Seen by psych. Can D/C back to [place].

## 2018-06-25 NOTE — ED BEHAVIORAL HEALTH ASSESSMENT NOTE - DESCRIPTION
Unremarkable, patient calm and laying on stretcher Constipation, D.M., HTN, Hyperlipidemia, Osteoarthritis [place]oved to [place] from [place] 3 years ago. Pt currently resides at [place] [address]

## 2018-06-25 NOTE — ED BEHAVIORAL HEALTH ASSESSMENT NOTE - CASE SUMMARY
Ms Franks is a 58 year old woman with a history of Schizophrenia who presented to the ER after she was found wandering. Psychiatric consult was called for a mental health evaluation. Patient does not currently appear acutely psychotic, manic or depressed. She does not have suicidal ideations, intent or plan for now.   At this time, patient is not considered a danger to herself or others and does not need inpatient psychiatric hospitalization. She may however benefit from outpatient referral for a Neurology evaluation to rule out Dementia given her history of wandering. She should also follow up with her outpatient psychiatrist on discharge.

## 2018-06-25 NOTE — ED BEHAVIORAL HEALTH ASSESSMENT NOTE - NS ED BHA MED ROS ALLERGIC IMMUNOLOGIC
- Likely rebound symptoms of migraine after recent nerve stimulator removal vs opioid discontinuation  - MRI brain not concerning for intracranial lesion responsible for headaches  - EEG does not show seizure activity, neurology recommends continued keppra  - continue dilaudid, toradol and benadryl  - Patient to follow up with pain specialist Dr Mahoney next week 6/2; Dr. Mahoney agreeable to discharge on short course of dilaudid  - Patient to follow up with neurology Dr. Ribera next wek 6/8, continue keppra; DMV form sent     Unable to assess

## 2018-06-25 NOTE — ED BEHAVIORAL HEALTH ASSESSMENT NOTE - OTHER PAST PSYCHIATRIC HISTORY (INCLUDE DETAILS REGARDING ONSET, COURSE OF ILLNESS, INPATIENT/OUTPATIENT TREATMENT)
Known history of schizophrenia, last psychiatric hospitalization at [facility] ([facility] inpatient unit and was eventually discharged to [facility] in January where she has been followed-up by [facility] psychiatrist, Dr. Chris

## 2018-06-25 NOTE — ED PROVIDER NOTE - OBJECTIVE STATEMENT
57 y/o F with a PMH of DM, HTN, HLD, OA, schizophrenia who is BIBEMS after found wandering on the street. In ER, patient stating that she did not have transportation so the lady on the street called the ambulance. Otherwise, she mumbling, and both I and the  had difficulty understanding what patient was saying. She is also asking for food.   : [id number]

## 2018-06-25 NOTE — ED ADULT TRIAGE NOTE - CHIEF COMPLAINT QUOTE
BIBA from outside, pt found [facility], pt is resident of [address] [place] [address]

## 2018-06-25 NOTE — ED PROVIDER NOTE - PHYSICAL EXAMINATION
Vital Signs: I have reviewed the initial vital signs.  Constitutional: NAD, well-nourished, appears stated age, no acute distress.  Head: NCAT  Eyes: Anicteric, PERRL. EOMI  ENT: MMM  CV: RRR, S1S2  Lungs: Unlabored respiratinos  ABD: ND, soft, nttp  MSK: Neck supple, nontender, nl ROM, no stepoff. Chest nontender. Back nontender in TLS spine or to b/l bony structures or flanks. Ext nontender, nl rom, no deformity.   INTEG: Skin warm, dry, no rash.  NEURO: A&O to person and place, CN II-XII intact, normal strength 5/5 all 4 ext, nl sensation throughout, mumbling speech at times, but otherwise normal, and coordination.  PSYCH: Calm, cooperative, normal affect and interaction. Vital Signs: I have reviewed the initial vital signs.  Constitutional: NAD, well-nourished, appears stated age, no acute distress.  Head: NCAT  Eyes: Anicteric, PERRL. EOMI  ENT: MMM  CV: RRR, S1S2  Lungs: Unlabored respiratinos  ABD: ND, soft, nttp  MSK: Neck supple, nontender, nl ROM, no stepoff. Chest nontender. Back nontender in TLS spine or to b/l bony structures or flanks. Ext nontender, nl rom, no deformity.   INTEG: Skin warm, dry, no rash.  NEURO: A&O to person and place, CN II-XII intact, normal strength 5/5 all 4 ext, nl sensation throughout, mumbling speech at times, but otherwise normal, and coordination.  PSYCH: Calm, cooperative, flat affect and disorganized thought process

## 2018-06-27 ENCOUNTER — EMERGENCY (EMERGENCY)
Facility: HOSPITAL | Age: 58
LOS: 0 days | Discharge: HOME | End: 2018-06-27
Attending: EMERGENCY MEDICINE | Admitting: EMERGENCY MEDICINE

## 2018-06-27 VITALS
HEART RATE: 84 BPM | SYSTOLIC BLOOD PRESSURE: 131 MMHG | TEMPERATURE: 97 F | RESPIRATION RATE: 18 BRPM | DIASTOLIC BLOOD PRESSURE: 70 MMHG | OXYGEN SATURATION: 97 %

## 2018-06-27 DIAGNOSIS — F20.9 SCHIZOPHRENIA, UNSPECIFIED: ICD-10-CM

## 2018-06-27 DIAGNOSIS — Z79.899 OTHER LONG TERM (CURRENT) DRUG THERAPY: ICD-10-CM

## 2018-06-27 DIAGNOSIS — E78.5 HYPERLIPIDEMIA, UNSPECIFIED: ICD-10-CM

## 2018-06-27 DIAGNOSIS — Z88.8 ALLERGY STATUS TO OTHER DRUGS, MEDICAMENTS AND BIOLOGICAL SUBSTANCES STATUS: ICD-10-CM

## 2018-06-27 DIAGNOSIS — E11.9 TYPE 2 DIABETES MELLITUS WITHOUT COMPLICATIONS: ICD-10-CM

## 2018-06-27 DIAGNOSIS — I10 ESSENTIAL (PRIMARY) HYPERTENSION: ICD-10-CM

## 2018-06-27 LAB
ALBUMIN SERPL ELPH-MCNC: 3.9 G/DL — SIGNIFICANT CHANGE UP (ref 3.5–5.2)
ALP SERPL-CCNC: 109 U/L — SIGNIFICANT CHANGE UP (ref 30–115)
ALT FLD-CCNC: 16 U/L — SIGNIFICANT CHANGE UP (ref 0–41)
ANION GAP SERPL CALC-SCNC: 13 MMOL/L — SIGNIFICANT CHANGE UP (ref 7–14)
AST SERPL-CCNC: 19 U/L — SIGNIFICANT CHANGE UP (ref 0–41)
BASOPHILS # BLD AUTO: 0.05 K/UL — SIGNIFICANT CHANGE UP (ref 0–0.2)
BASOPHILS NFR BLD AUTO: 0.6 % — SIGNIFICANT CHANGE UP (ref 0–1)
BILIRUB SERPL-MCNC: <0.2 MG/DL — SIGNIFICANT CHANGE UP (ref 0.2–1.2)
BUN SERPL-MCNC: 40 MG/DL — HIGH (ref 10–20)
CALCIUM SERPL-MCNC: 9.7 MG/DL — SIGNIFICANT CHANGE UP (ref 8.5–10.1)
CHLORIDE SERPL-SCNC: 106 MMOL/L — SIGNIFICANT CHANGE UP (ref 98–110)
CO2 SERPL-SCNC: 21 MMOL/L — SIGNIFICANT CHANGE UP (ref 17–32)
CREAT SERPL-MCNC: 1.3 MG/DL — SIGNIFICANT CHANGE UP (ref 0.7–1.5)
EOSINOPHIL # BLD AUTO: 0.2 K/UL — SIGNIFICANT CHANGE UP (ref 0–0.7)
EOSINOPHIL NFR BLD AUTO: 2.2 % — SIGNIFICANT CHANGE UP (ref 0–8)
GLUCOSE SERPL-MCNC: 324 MG/DL — HIGH (ref 70–99)
HCT VFR BLD CALC: 34.6 % — LOW (ref 37–47)
HGB BLD-MCNC: 11.4 G/DL — LOW (ref 12–16)
IMM GRANULOCYTES NFR BLD AUTO: 0.7 % — HIGH (ref 0.1–0.3)
LYMPHOCYTES # BLD AUTO: 2.23 K/UL — SIGNIFICANT CHANGE UP (ref 1.2–3.4)
LYMPHOCYTES # BLD AUTO: 24.6 % — SIGNIFICANT CHANGE UP (ref 20.5–51.1)
MCHC RBC-ENTMCNC: 28.5 PG — SIGNIFICANT CHANGE UP (ref 27–31)
MCHC RBC-ENTMCNC: 32.9 G/DL — SIGNIFICANT CHANGE UP (ref 32–37)
MCV RBC AUTO: 86.5 FL — SIGNIFICANT CHANGE UP (ref 81–99)
MONOCYTES # BLD AUTO: 0.63 K/UL — HIGH (ref 0.1–0.6)
MONOCYTES NFR BLD AUTO: 6.9 % — SIGNIFICANT CHANGE UP (ref 1.7–9.3)
NEUTROPHILS # BLD AUTO: 5.91 K/UL — SIGNIFICANT CHANGE UP (ref 1.4–6.5)
NEUTROPHILS NFR BLD AUTO: 65 % — SIGNIFICANT CHANGE UP (ref 42.2–75.2)
NRBC # BLD: 0 /100 WBCS — SIGNIFICANT CHANGE UP (ref 0–0)
PLATELET # BLD AUTO: 302 K/UL — SIGNIFICANT CHANGE UP (ref 130–400)
POTASSIUM SERPL-MCNC: 5.1 MMOL/L — HIGH (ref 3.5–5)
POTASSIUM SERPL-SCNC: 5.1 MMOL/L — HIGH (ref 3.5–5)
PROT SERPL-MCNC: 7.2 G/DL — SIGNIFICANT CHANGE UP (ref 6–8)
RBC # BLD: 4 M/UL — LOW (ref 4.2–5.4)
RBC # FLD: 13.2 % — SIGNIFICANT CHANGE UP (ref 11.5–14.5)
SODIUM SERPL-SCNC: 140 MMOL/L — SIGNIFICANT CHANGE UP (ref 135–146)
WBC # BLD: 9.08 K/UL — SIGNIFICANT CHANGE UP (ref 4.8–10.8)
WBC # FLD AUTO: 9.08 K/UL — SIGNIFICANT CHANGE UP (ref 4.8–10.8)

## 2018-06-27 NOTE — ED PROVIDER NOTE - PHYSICAL EXAMINATION
--EXAM--  VITAL SIGNS: I have reviewed vs documented at present.  CONSTITUTIONAL: Well-developed; well-nourished; in no acute distress.   SKIN: Warm and dry, no acute rash.   HEAD: Normocephalic; atraumatic.  EYES: PERRL, EOM intact; conjunctiva and sclera clear. No nystagmus.  ENT: No nasal discharge; airway clear.  NECK: Supple; non tender.  CARD: S1, S2, Regular rate and rhythm.   RESP: No wheezes, rales or rhonchi.  ABD: Normal bowel sounds; soft; non-distended; non-tender.  EXT: Normal ROM.   NEURO: Alert, oriented, grossly unremarkable. Strength 5/5 in all extremities. Sensation intact throughout.  PSYCH: Cooperative,e

## 2018-06-27 NOTE — ED PROVIDER NOTE - ATTENDING CONTRIBUTION TO CARE
57 yo F presents via EMS found wandering.  Pt was seen in ED twice this month for similar.  Pt states that she lives in [place] and has been walking a lot.  On exam pt in [facility] alert and awake, no signs of trauma, steady gait, Op clear, no tremor

## 2018-06-27 NOTE — ED PROVIDER NOTE - OBJECTIVE STATEMENT
this is 59 yo female presents to ed by ems. patient was found at bus . Bystander called 911 because she was talking to herself. patient states her legs hurt when she walks a lot.   Patient no other complaints now

## 2018-06-27 NOTE — ED PROVIDER NOTE - NS ED ROS FT
Review of Systems:  	•	CONSTITUTIONAL - no fever, no diaphoresis, no chills  	•	SKIN - no rash  	•	HEMATOLOGIC - no bleeding, no bruising  	•	EYES - no eye pain, no blurry vision  	•	ENT - no change in hearing, no sore throat, no ear pain or tinnitus  	•	RESPIRATORY - no shortness of breath, no cough  	•	CARDIAC - no chest pain, no palpitations  	•	GI - no abd pain, no nausea, no vomiting, no diarrhea, no constipation  	  	•	MUSCULOSKELETAL - no joint paint, no swelling, no redness  	•	NEUROLOGIC - no weakness, no headache, no paresthesias, no LOC  	•	PSYCH - anxiety, non suicidal, non homicidal, no hallucination, no depression

## 2018-06-27 NOTE — ED BEHAVIORAL HEALTH NOTE - BEHAVIORAL HEALTH NOTE
CC: "My legs hurt"     HPI: 57yo Kyrgyz speaking only HW with long h/o schizophrenia, [place] TLR3 resident (as per records), frequent ED visits, BIBA found wandering on a bus stop. Chart was reviewed, Pt was interviewed with the assistance of  (#[id number]). Pt was found wandering around near bus stating and was brought to ED. She is calm, cooperative, pleasant with some inappropriate smiling and laughter. She denies SI, HI, positive sx of psychosis and her only complaint is "my legs hurt". According to EMR this is Pts typical presentation and the baseline.    ED course: calm, sleeping covered by sheets    PPH: h/o schizophrenia, followed at [facility] by Dr Chris    PMH: constipation, DM, HTN, dyslipidemia, OA.    Drug Hx: none known    FH: unknown    SH: [place] resident    MSE: A,O to "[place]" and year 2018, states her age is 60 (stated age 58), laying in stretcher, calm, cooperative, somewhat unkempt, no PMA/R, speech in Kyrgyz, NL rate/volume, mood "OK", affect odd, t/p hard to assess due to language barrier, T/C no overt delusions, no si/hi, denies ah/vh, i/j limited.    A/P 57yo HW with h/o schizophrenia BIBA to ED due to wondering which is her baseline. No overt positive sx of psychosis, prominent negative sx and cognitive impairment. Pt does not need IPP admission and can be discharged back to residence if medically cleared.     Dx schizophrenia.

## 2018-07-10 ENCOUNTER — EMERGENCY (EMERGENCY)
Facility: HOSPITAL | Age: 58
LOS: 0 days | Discharge: HOME | End: 2018-07-10
Attending: EMERGENCY MEDICINE | Admitting: EMERGENCY MEDICINE

## 2018-07-10 VITALS
HEART RATE: 74 BPM | TEMPERATURE: 97 F | RESPIRATION RATE: 18 BRPM | OXYGEN SATURATION: 97 % | DIASTOLIC BLOOD PRESSURE: 77 MMHG | SYSTOLIC BLOOD PRESSURE: 166 MMHG

## 2018-07-10 VITALS
SYSTOLIC BLOOD PRESSURE: 141 MMHG | OXYGEN SATURATION: 100 % | HEART RATE: 80 BPM | DIASTOLIC BLOOD PRESSURE: 77 MMHG | TEMPERATURE: 98 F | RESPIRATION RATE: 18 BRPM

## 2018-07-10 DIAGNOSIS — I10 ESSENTIAL (PRIMARY) HYPERTENSION: ICD-10-CM

## 2018-07-10 DIAGNOSIS — H53.149 VISUAL DISCOMFORT, UNSPECIFIED: ICD-10-CM

## 2018-07-10 DIAGNOSIS — Z88.7 ALLERGY STATUS TO SERUM AND VACCINE: ICD-10-CM

## 2018-07-10 DIAGNOSIS — Z79.84 LONG TERM (CURRENT) USE OF ORAL HYPOGLYCEMIC DRUGS: ICD-10-CM

## 2018-07-10 DIAGNOSIS — F20.9 SCHIZOPHRENIA, UNSPECIFIED: ICD-10-CM

## 2018-07-10 DIAGNOSIS — Z79.52 LONG TERM (CURRENT) USE OF SYSTEMIC STEROIDS: ICD-10-CM

## 2018-07-10 DIAGNOSIS — Z79.51 LONG TERM (CURRENT) USE OF INHALED STEROIDS: ICD-10-CM

## 2018-07-10 DIAGNOSIS — Z88.8 ALLERGY STATUS TO OTHER DRUGS, MEDICAMENTS AND BIOLOGICAL SUBSTANCES STATUS: ICD-10-CM

## 2018-07-10 DIAGNOSIS — Z79.899 OTHER LONG TERM (CURRENT) DRUG THERAPY: ICD-10-CM

## 2018-07-10 DIAGNOSIS — E11.9 TYPE 2 DIABETES MELLITUS WITHOUT COMPLICATIONS: ICD-10-CM

## 2018-07-10 NOTE — ED PROVIDER NOTE - PHYSICAL EXAMINATION
CONSTITUTIONAL: Well-developed; well-nourished; in no acute distress.   SKIN: warm, dry  HEAD: Normocephalic; atraumatic.  EYES: PERRL, EOMI, normal sclera and conjunctiva   ENT: No nasal discharge; airway clear.  NECK: Supple; non tender.  CARD: S1, S2 normal; no murmurs, gallops, or rubs. Regular rate and rhythm.   RESP: No wheezes, rales or rhonchi.  ABD: soft ntnd  EXT: Normal ROM.  No clubbing, cyanosis or edema.   LYMPH: No acute cervical adenopathy.  NEURO: Alert, oriented, grossly unremarkable. NO cranial nerve deficits, moving all extremities well. Able to ambulate with normal gait.   PSYCH: Uncooperative with exam, refusing to answer questions.

## 2018-07-10 NOTE — ED BEHAVIORAL HEALTH ASSESSMENT NOTE - SUMMARY
58 year old woman, single, domiciled at [place] [address] with known history of schizophrenia who was brought to the ED by EMS after she was found wandering in public.  The patient has a history notable for chronic schizophrenia with a history of wandering since a young age and chronic auditory hallucinations.  Recent history has included multiple events of wandering from her psychiatric residence.  This presentation is similar to those of the past and she does not present with new modifiable risk factors for increased imminent risk of self harm or harm of others.  It would be preferable for her to continue outpatient treatment at her residence than  for readmission to an acute care inpatient unit as the benefits would likely be minimal.

## 2018-07-10 NOTE — ED BEHAVIORAL HEALTH ASSESSMENT NOTE - HPI (INCLUDE ILLNESS QUALITY, SEVERITY, DURATION, TIMING, CONTEXT, MODIFYING FACTORS, ASSOCIATED SIGNS AND SYMPTOMS)
58 year old woman, single, domiciled at [facility] with known history of schizophrenia who was brought to the ED by EMS after she was found wandering in public without agitation.  She has multiple past presentations for wandering and has been cleared in the ED and sent back to her TLR.  She was interviewed in Bengali.      Upon interview, the patient was calm but frustrated by being in the ED again.  She again reported that she was “walking around,” and that she wants return to her TLR.  She reported having taken her medication.  Denied use of substances.  She was asked why she left the TLR, but she gives no clear reason.  However she denied abuse there.  She reported chronic auditory hallucinations that are non-command type.  She otherwise denied other psychotic symptoms, mood symptoms, SI, and HI.  She discussed the importance of her family and what she does to keep herself happy in her TLR.    As per staff at her residence and per chart review along with my personally evaluating her in the past, the patient has been wandering periodically especially so over the past month.  However, she has otherwise been in behavioral control in her residence and in public.  This is her baseline behavior.  During previous presentations, collateral has been obtained from her sister Erich Cristobal ([phone]) who has reported that the patient experiences AH at baseline and has been wandering since a young age at the point of diagnosis.  She has been adherent to medication and was at her residence last night, leaving this morning.  Last Paliperidone IM 6/26/18.  She follows with Dr. Chris.

## 2018-07-10 NOTE — ED PROVIDER NOTE - ATTENDING CONTRIBUTION TO CARE
57yo woman h/o HTN, schizophrenia, lives at [facility] when she was found sitting down at bank. Pt has been to the ED 6 times in the last several weeks [place], wandering in the street. All history is via  phone and though pt has some chronic complaints, she needs frequent redirection, laughs inappropriately, and can not give clear chronology of events. On exam pt is nontoxic appearing and cooperative, and on reassessment says she feels better. AARON, lungs CTA, CVS1S2 RRR abd soft, NT. Neuro intact. Labs/imaging from previous visits reviewed; they were normal. Last head CT 3 weeks ago was normal. EKG today unremarkable. Spoke with psych, they know her well as she is frequently in the ED; they feel this is her baseline and comfortable with d/c back to [facility].

## 2018-07-10 NOTE — ED PROVIDER NOTE - OBJECTIVE STATEMENT
58 y f pmh htn, hpl, dm pw ha. Headache for the past 4 years. Associated with photophobia. NO radiation. Tried taking tylenol at home for pain with no relief. Also says her chest has been hurting for the past 4 years. Says she was brought in today by EMS because she sat down at a bank and some lady called EMS because of her diseases. Denies fever, chills, sob, abd pain, n/v, head trauma. Currently says she has no pain at this time. Pt refusing to answer questions throughout the exam, not cooperating. 58 y f pmh htn, hpl, dm pw ha. Headache for the past 4 years. Associated with photophobia. NO radiation. Tried taking tylenol at home for pain with no relief. Also says her chest has been hurting for the past 4 years. Says she was brought in today by EMS because she sat down at a bank and some lady called EMS because of her diseases. Denies fever, chills, sob, abd pain, n/v, head trauma. Currently says she has no pain at this time. Pt refusing to answer questions throughout the exam, not cooperating.  [id number]

## 2018-07-10 NOTE — ED PROVIDER NOTE - NS ED ROS FT
Eyes:  No visual changes, eye pain or discharge.  ENMT:  No hearing changes, pain, discharge or infections. No neck pain or stiffness.  Cardiac:  CP. No SOB or edema.  Respiratory:  No cough or respiratory distress.   GI:  No nausea, vomiting, diarrhea or abdominal pain.  :  No dysuria, frequency or burning.  MS:  No myalgia, muscle weakness, joint pain or back pain.  Neuro:  Headache. No weakness.  No LOC.  Skin:  No skin rash.   Endocrine: No history of thyroid disease. DM.

## 2018-07-10 NOTE — ED BEHAVIORAL HEALTH ASSESSMENT NOTE - OTHER PAST PSYCHIATRIC HISTORY (INCLUDE DETAILS REGARDING ONSET, COURSE OF ILLNESS, INPATIENT/OUTPATIENT TREATMENT)
Known history of schizophrenia, last psychiatric hospitalization at [facility] ([facility]) inpatient unit and was eventually discharged to [facility] in January where she has been followed-up by [facility] psychiatrist, Dr. Chris.  No known hsitory of intentional self harm, suicide attempts, or violence.

## 2018-07-10 NOTE — ED ADULT NURSE NOTE - OBJECTIVE STATEMENT
Pt came with EMS state she was sitting on the banch outside the bank and  EMS was called ,Pt calm C.O B/L feet pain ,no N/V no other symptom no suicide thought ,pt from [facility] living facility HX- DM ,HT

## 2018-07-10 NOTE — ED ADULT NURSE REASSESSMENT NOTE - NS ED NURSE REASSESS COMMENT FT1
Pt is seen evaluate by ED attending denies no pain no SOB no N.V ambulate steady ,dinner tray provide did eat 100% of dinner ,pt clear to be D.C to the facility left ED with EMS NAD noted .

## 2018-07-10 NOTE — ED PROVIDER NOTE - PROGRESS NOTE DETAILS
Psych consult called, will come see pt. Spoke with psychiatry. Said nothing acute to do for the patient. Cleared to return to [facility].

## 2018-07-26 NOTE — ED BEHAVIORAL HEALTH ASSESSMENT NOTE - AFFECT RANGE
I received a fax from the pharmacy needing a PA for the lidoderm patch since it is 3 patches on at a time and not one patch.
Constricted

## 2018-08-06 NOTE — ED BEHAVIORAL HEALTH ASSESSMENT NOTE - GAIT / STATION
Dear Yarelis Penny    Thank you for choosing [facility] ([facility]) for your Entyvio infusion.  The following information is a summary of our appointment as well as important reminders.      We look forward in seeing you on your next appointment here at [facility].  Please don t hesitate to call us at [phone] to reschedule any of your appointments or to speak with one of the [facility] registered nurses.  It was a pleasure taking care of you today.    Sincerely,    [facility]  [address] [place] [address]  [id number]  Phone:  [phone]    
Normal gait / station

## 2018-08-23 PROBLEM — M19.90 UNSPECIFIED OSTEOARTHRITIS, UNSPECIFIED SITE: Chronic | Status: ACTIVE | Noted: 2018-03-13

## 2018-08-23 PROBLEM — I10 ESSENTIAL (PRIMARY) HYPERTENSION: Chronic | Status: ACTIVE | Noted: 2018-02-13

## 2018-08-23 PROBLEM — K59.00 CONSTIPATION, UNSPECIFIED: Chronic | Status: ACTIVE | Noted: 2018-03-13

## 2018-08-23 PROBLEM — E11.9 TYPE 2 DIABETES MELLITUS WITHOUT COMPLICATIONS: Chronic | Status: ACTIVE | Noted: 2018-02-13

## 2018-08-23 PROBLEM — E78.5 HYPERLIPIDEMIA, UNSPECIFIED: Chronic | Status: ACTIVE | Noted: 2018-03-13

## 2018-08-23 PROBLEM — I95.1 ORTHOSTATIC HYPOTENSION: Chronic | Status: ACTIVE | Noted: 2018-03-13

## 2018-08-23 PROBLEM — E55.9 VITAMIN D DEFICIENCY, UNSPECIFIED: Chronic | Status: ACTIVE | Noted: 2018-03-13

## 2018-08-23 PROBLEM — F20.9 SCHIZOPHRENIA, UNSPECIFIED: Chronic | Status: ACTIVE | Noted: 2018-02-13

## 2018-08-23 RX ORDER — SERTRALINE 25 MG/1
0 TABLET, FILM COATED ORAL
Qty: 0 | Refills: 0 | COMMUNITY

## 2018-08-23 RX ORDER — PALIPERIDONE 1.5 MG/1
0 TABLET, EXTENDED RELEASE ORAL
Qty: 0 | Refills: 0 | COMMUNITY

## 2018-08-23 RX ORDER — AMLODIPINE BESYLATE 2.5 MG/1
0 TABLET ORAL
Qty: 0 | Refills: 0 | COMMUNITY

## 2018-08-23 RX ORDER — LACTULOSE 10 G/15ML
0 SOLUTION ORAL
Qty: 0 | Refills: 0 | COMMUNITY

## 2018-08-23 RX ORDER — HYDRALAZINE HCL 50 MG
0 TABLET ORAL
Qty: 0 | Refills: 0 | COMMUNITY

## 2018-08-23 RX ORDER — CHOLECALCIFEROL (VITAMIN D3) 125 MCG
0 CAPSULE ORAL
Qty: 0 | Refills: 0 | COMMUNITY

## 2018-09-20 ENCOUNTER — EMERGENCY (EMERGENCY)
Facility: HOSPITAL | Age: 58
LOS: 0 days | Discharge: PSYCHIATRIC OR SUBSTANCE USE FACILITY | End: 2018-09-20
Attending: EMERGENCY MEDICINE | Admitting: EMERGENCY MEDICINE

## 2018-09-20 VITALS
OXYGEN SATURATION: 100 % | TEMPERATURE: 98 F | SYSTOLIC BLOOD PRESSURE: 172 MMHG | DIASTOLIC BLOOD PRESSURE: 88 MMHG | RESPIRATION RATE: 20 BRPM | HEART RATE: 98 BPM

## 2018-09-20 DIAGNOSIS — Z88.8 ALLERGY STATUS TO OTHER DRUGS, MEDICAMENTS AND BIOLOGICAL SUBSTANCES: ICD-10-CM

## 2018-09-20 DIAGNOSIS — F20.9 SCHIZOPHRENIA, UNSPECIFIED: ICD-10-CM

## 2018-09-20 DIAGNOSIS — E78.5 HYPERLIPIDEMIA, UNSPECIFIED: ICD-10-CM

## 2018-09-20 DIAGNOSIS — Z79.899 OTHER LONG TERM (CURRENT) DRUG THERAPY: ICD-10-CM

## 2018-09-20 DIAGNOSIS — I10 ESSENTIAL (PRIMARY) HYPERTENSION: ICD-10-CM

## 2018-09-20 DIAGNOSIS — R41.0 DISORIENTATION, UNSPECIFIED: ICD-10-CM

## 2018-09-20 DIAGNOSIS — E11.9 TYPE 2 DIABETES MELLITUS WITHOUT COMPLICATIONS: ICD-10-CM

## 2018-09-20 LAB
ALBUMIN SERPL ELPH-MCNC: 3.9 G/DL — SIGNIFICANT CHANGE UP (ref 3.5–5.2)
ALP SERPL-CCNC: 121 U/L — HIGH (ref 30–115)
ALT FLD-CCNC: 24 U/L — SIGNIFICANT CHANGE UP (ref 0–41)
ANION GAP SERPL CALC-SCNC: 14 MMOL/L — SIGNIFICANT CHANGE UP (ref 7–14)
APAP SERPL-MCNC: <5 UG/ML — LOW (ref 10–30)
AST SERPL-CCNC: 26 U/L — SIGNIFICANT CHANGE UP (ref 0–41)
BASOPHILS # BLD AUTO: 0.04 K/UL — SIGNIFICANT CHANGE UP (ref 0–0.2)
BASOPHILS NFR BLD AUTO: 0.5 % — SIGNIFICANT CHANGE UP (ref 0–1)
BILIRUB SERPL-MCNC: 0.3 MG/DL — SIGNIFICANT CHANGE UP (ref 0.2–1.2)
BUN SERPL-MCNC: 23 MG/DL — HIGH (ref 10–20)
CALCIUM SERPL-MCNC: 9.5 MG/DL — SIGNIFICANT CHANGE UP (ref 8.5–10.1)
CHLORIDE SERPL-SCNC: 107 MMOL/L — SIGNIFICANT CHANGE UP (ref 98–110)
CO2 SERPL-SCNC: 22 MMOL/L — SIGNIFICANT CHANGE UP (ref 17–32)
CREAT SERPL-MCNC: 0.9 MG/DL — SIGNIFICANT CHANGE UP (ref 0.7–1.5)
EOSINOPHIL # BLD AUTO: 0.13 K/UL — SIGNIFICANT CHANGE UP (ref 0–0.7)
EOSINOPHIL NFR BLD AUTO: 1.6 % — SIGNIFICANT CHANGE UP (ref 0–8)
ETHANOL SERPL-MCNC: <10 MG/DL — HIGH
GLUCOSE SERPL-MCNC: 241 MG/DL — HIGH (ref 70–99)
HCG SERPL QL: NEGATIVE — SIGNIFICANT CHANGE UP
HCT VFR BLD CALC: 36.5 % — LOW (ref 37–47)
HGB BLD-MCNC: 11.8 G/DL — LOW (ref 12–16)
HIV 1 & 2 AB SERPL IA.RAPID: SIGNIFICANT CHANGE UP
IMM GRANULOCYTES NFR BLD AUTO: 0.4 % — HIGH (ref 0.1–0.3)
LYMPHOCYTES # BLD AUTO: 1.96 K/UL — SIGNIFICANT CHANGE UP (ref 1.2–3.4)
LYMPHOCYTES # BLD AUTO: 24.1 % — SIGNIFICANT CHANGE UP (ref 20.5–51.1)
MCHC RBC-ENTMCNC: 27.3 PG — SIGNIFICANT CHANGE UP (ref 27–31)
MCHC RBC-ENTMCNC: 32.3 G/DL — SIGNIFICANT CHANGE UP (ref 32–37)
MCV RBC AUTO: 84.5 FL — SIGNIFICANT CHANGE UP (ref 81–99)
MONOCYTES # BLD AUTO: 0.55 K/UL — SIGNIFICANT CHANGE UP (ref 0.1–0.6)
MONOCYTES NFR BLD AUTO: 6.8 % — SIGNIFICANT CHANGE UP (ref 1.7–9.3)
NEUTROPHILS # BLD AUTO: 5.43 K/UL — SIGNIFICANT CHANGE UP (ref 1.4–6.5)
NEUTROPHILS NFR BLD AUTO: 66.6 % — SIGNIFICANT CHANGE UP (ref 42.2–75.2)
NRBC # BLD: 0 /100 WBCS — SIGNIFICANT CHANGE UP (ref 0–0)
PLATELET # BLD AUTO: 289 K/UL — SIGNIFICANT CHANGE UP (ref 130–400)
POTASSIUM SERPL-MCNC: 5.2 MMOL/L — HIGH (ref 3.5–5)
POTASSIUM SERPL-SCNC: 5.2 MMOL/L — HIGH (ref 3.5–5)
PROT SERPL-MCNC: 6.9 G/DL — SIGNIFICANT CHANGE UP (ref 6–8)
RBC # BLD: 4.32 M/UL — SIGNIFICANT CHANGE UP (ref 4.2–5.4)
RBC # FLD: 12.5 % — SIGNIFICANT CHANGE UP (ref 11.5–14.5)
SALICYLATES SERPL-MCNC: <0.3 MG/DL — LOW (ref 4–30)
SODIUM SERPL-SCNC: 143 MMOL/L — SIGNIFICANT CHANGE UP (ref 135–146)
WBC # BLD: 8.14 K/UL — SIGNIFICANT CHANGE UP (ref 4.8–10.8)
WBC # FLD AUTO: 8.14 K/UL — SIGNIFICANT CHANGE UP (ref 4.8–10.8)

## 2018-09-20 NOTE — ED ADULT NURSE NOTE - OBJECTIVE STATEMENT
patient found wondering in the street, crying by EMS. patient states she is lost and does not know where she lives. states she hears voices and haylee talks to her. denies any suicidal/homicidal ideation. is sitting in chair with 1:1 observation by PCA. translation by RN that speaks Estonian fluently

## 2018-09-20 NOTE — ED PROVIDER NOTE - PMH
Constipation    Diabetes mellitus    DM (diabetes mellitus)    HLD (hyperlipidemia)    HTN (hypertension)    Hyperlipidemia    Hypertension    Orthostatic hypotension    Osteoarthritis    Schizophrenia    Schizophrenia    Vitamin D deficiency

## 2018-09-20 NOTE — ED BEHAVIORAL HEALTH ASSESSMENT NOTE - OTHER PAST PSYCHIATRIC HISTORY (INCLUDE DETAILS REGARDING ONSET, COURSE OF ILLNESS, INPATIENT/OUTPATIENT TREATMENT)
Patient has a past psychiatric diagnosis of schizophrenia, with multiple prior IPP admissions (most recently at [facility] 8/22), no prior suicide attempts and follows with psychiatrist at [facility] (, [phone]).  Patient currently compliant with Olanzapine 10mg qhs, Invega Paliperidone IM 234mg qmonthly (last given 9/11/18), Gabapentin 300mg TID, and Benztropine .5mg BID.

## 2018-09-20 NOTE — ED PROVIDER NOTE - PROGRESS NOTE DETAILS
D/w Dr. Sanchez of psychiatry who will evaluate D/w Dr. Sanchez of psychiatry who states is at her baseline and appropriate for discharge D/w Dr. Sanchez of psychiatry who states is at her baseline and appropriate for discharge back to [facility] and that they are working on NH placement

## 2018-09-20 NOTE — ED ADULT TRIAGE NOTE - CHIEF COMPLAINT QUOTE
Pt was found by EMS wandering in the street crying, states "Help me I'm lost and I want to go back home." Pt unable to state her address, as per SS# match, pt is from [address] [place] [address]. Pt disoriented, denies suicidal/homicidal ideation.

## 2018-09-20 NOTE — ED BEHAVIORAL HEALTH ASSESSMENT NOTE - DETAILS
Patient discharged from [facility] on 8/22; details in HPI N/A Details of hospital stay obtained from Sherice Herrera, Director of [facility] at [facility] ([phone]) complaints of leg pain [facility] notified of patient's return

## 2018-09-20 NOTE — ED BEHAVIORAL HEALTH ASSESSMENT NOTE - HPI (INCLUDE ILLNESS QUALITY, SEVERITY, DURATION, TIMING, CONTEXT, MODIFYING FACTORS, ASSOCIATED SIGNS AND SYMPTOMS)
Patient is a 58 year old  female, domiciled at [place] [address], single, unemployed, no legal or substance abuse history, with past psychiatric history of schizophrenia, multiple IPP admissions (last discharged from [facility] on 8/22), no history of suicide attempts, who was brought to the ED by EMT after she was found wandering on the street.  Consult was placed for psychiatric evaluation. Patient is a 58 year old  female, domiciled at [place] [address], single, unemployed, no legal or substance abuse history, with past psychiatric history of schizophrenia, multiple IPP admissions (last discharged from [facility] on 8/22), no history of suicide attempts, who was brought to the ED by EMT after she was found wandering on the street (multiple such presentations to the ED; most recent 7/10/18).  Consult was placed for psychiatric evaluation. Patient is a 58 year old  female, domiciled at [facility] [facility], single, unemployed, no legal or substance abuse history, with past psychiatric history of schizophrenia, multiple IPP admissions (last discharged from [facility] on 8/22), no history of suicide attempts, who was brought to the ED by EMT after she was found wandering on the street (multiple such presentations to the ED; most recent 7/10/18).  Consult was placed for psychiatric evaluation.    On approach, patient sitting in the ED eating a sandwich.  On interview (with  services; access code: [id number]) , patient unable to recount the events of the day, however, states that she came to the hospital to "get a sandwich and coffee."  She states that "she lives in a house," however, unable to provide specifics, nor able to recognize the name of her residence when prompted.  Patient does state that she is compliant with her medications.  She denies currently hearing any voices, or having thoughts of wanting to harm herself or others.  Patient able to state her correct name, however, unable to provide correct time ("September 21, 1978") or place ("[facility]").  Remainder of interview is difficult given patient's disorganized thought process.    Collateral was obtained from [facility] (Alisha Zimmer RN; [phone]), who stated that patient has a habit of leaving the unit, getting lost and not coming back, and being found by police/EMT.  Patient is said to be complaint with her medications on the unit.  Patient's medications were recently changed following her recent inpatient psychiatric hospitalization at [facility] (details below) and is currently on following regimen: Olanzapine 10mg qhs, Invega Paliperidone IM 234mg qmonthly (last given 9/11/18), Gabapentin 300mg TID, and Benztropine .5mg BID.  Given patient's frequent wandering, [facility] initiated an application for a nursnig home and [facility] is in process, a neurology evaluation is also pending. Patient said to be alert and oriented x 3 at baseline.     Collateral was also obtained from Sherice Herrera (Director of [facility] clinical at [place], [phone]) in regards to her most recent psychiatric hospitalization at [facility] : Pt was brought to the hospital after being found in a stranger's car.  She was admitted to medicine and treated for UTI (febrile). 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 Pt was then transferred to psychiatry and received an IM injection which calmed the pt down. There was a neurology consult placed which just reported same findings. Neuropsych testing done on 7/24 and 7/27 was complicated by pt’s limited education and language barrier - showed impaired cognitive behavior, visual spatial abnormalities, and weak memory. Concluded patient would require day to day help with daily activities, dx of schizophrenia and unspecified neurocognitive dysfunction. Patient is a 58 year old  female, domiciled at [facility] [facility] [address], single, unemployed, no legal or substance abuse history, with past psychiatric history of schizophrenia, multiple IPP admissions (last discharged from [facility] on 8/22), no history of suicide attempts, who was brought to the ED by EMT after she was found wandering on the street (multiple such presentations to the ED; most recent 7/10/18).  Consult was placed for psychiatric evaluation.    On approach, patient sitting in the ED eating a sandwich.  She is noted to be calm and cooperative. On interview (with  services; access code: [id number]) , patient , states that she came to the hospital to "get a sandwich and coffee."  She states that "she lives in a house."  Patient does state that she is compliant with her medications.  Patient able to state her correct name, however, unable to provide correct time ("September 21, 1978") or place ("[facility]").  When asked questions in regards to psychotic symptoms, she states that she would like treatment for her swollen feet. When asked open ended questions in regards to recent events she states, "the both of you would make a good couple" When redirected, she denies perceptual disturbances, suicidal or aggressive ideations. She does not demonstrate overt delusional thought content.     Collateral was obtained from [facility] (Alisha Zimmer RN; [phone]), who stated that patient has a habit of leaving the unit, getting lost and not coming back, and being found by police/EMT.  Patient is said to be complaint with her medications on the unit.  Patient's medications were recently changed following her recent inpatient psychiatric hospitalization at [facility] (details below) and is currently on following regimen: Olanzapine 10mg qhs, Invega Paliperidone IM 234mg qmonthly (last given 9/11/18), Gabapentin 300mg TID, and Benztropine .5mg BID.  Given patient's frequent wandering, [facility] initiated an application for a nursnig home and [facility] is in process, a neurology evaluation is also pending. Patient said to be alert and oriented x 3 at baseline.     Collateral was also obtained from Sherice Herrera (Director of [facility] clinical at [place], [phone]) in regards to her [facility] : Pt was brought to the hospital after being found in a stranger's car.  She was admitted to medicine and treated for UTI (febrile). 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 Pt was then transferred to psychiatry and received an IM injection which calmed the pt down. There was a neurology consult placed which just reported same findings. Neuropsych testing done on 7/24 and 7/27 was complicated by pt’s limited education and language barrier - showed impaired cognitive behavior, visual spatial abnormalities, and weak memory. Concluded patient would require day to day help with daily activities, dx of schizophrenia and unspecified neurocognitive dysfunction.

## 2018-09-20 NOTE — ED ADULT NURSE NOTE - CHIEF COMPLAINT QUOTE
Pt was found by EMS wandering in the street crying, states "Help me I'm lost and I want to go back home." Pt unable to state her address, as per SS# match, pt is from [address] [place] [address]. Pt disoriented, denies suicidal/homicidal ideation.

## 2018-09-20 NOTE — ED PROVIDER NOTE - OBJECTIVE STATEMENT
Ladi ED tech as .  58yoF with h/o HTN, HLD, DM, schizophrenia, BIBA for being found confused wandering in street. In ED, . When asked why she is here, states Ladi  tech as .  58yoF with h/o HTN, HLD, DM, schizophrenia, BIBA for being found confused wandering in street. In ED, asking for food and waving at all passersby, also intermittently ebullient and intermittently crying. When asked multiple times why she is here, states eventually that she has not been getting her meds for her back, swollen legs, and eyes. Denies SI, HI, AH, VH. Patient from [address] [place] [address].

## 2018-09-20 NOTE — ED BEHAVIORAL HEALTH ASSESSMENT NOTE - CASE SUMMARY
58 year old  female, domiciled at [facility], single, unemployed, no legal or substance abuse history, with past psychiatric history of schizophrenia, multiple IPP admissions (last discharged from [facility] on 8/22), no history of suicide attempts, who was brought to the ED by EMT after she was found wandering on the street (multiple such presentations to the ED; most recent 7/10/18).  Consult was placed for psychiatric evaluation. Patient was evaluated via  (see info above).    On evaluation patient is reports being oriented to name only. ED physician reports that the patient was oriented x3 on his examination. It is unclear if patient's "disorientation" is secondary to an underlying delirium/cognitive impairment or if she has difficulty communicating via  phone as was noted during his neuropsych testing at [facility]. Patient's disorganized behavior of wandering away from [facility] is recurrent as noted on prior ED assessments/collateral obtained from [facility]. Patient's disorganized speech marked by tangentiality is consistent with her baseline behavior as noted by staff at [facility]. Patient's symptoms may be secondary to residual symptoms from schizophrenia vs. cognitive impairment vs. delirium. In regards to schizophrenia, she is compliant with psychiatric medications/follow up with Dr. Chris. With the help of the structured living facility ([facility]), she is able to maintain her ADLs. Based on risk assessment above, she does not present with imminent risk for self injury/aggression. She thus does not warrant psychiatric admission on 9.27/9.39 basis and can should continue outpatient care with Dr. Chris. 58 year old  female, domiciled at [facility], single, unemployed, no legal or substance abuse history, with past psychiatric history of schizophrenia, multiple IPP admissions (last discharged from [facility] on 8/22), no history of suicide attempts, who was brought to the ED by EMT after she was found wandering on the street (multiple such presentations to the ED; most recent 7/10/18).  Consult was placed for psychiatric evaluation. Patient was evaluated via  (see info above).    Patient has been noted to be calm and cooperative in the ED without any episodes of agitation. On evaluation patient is reports being oriented to name only. ED physician reports that the patient was oriented x3 on his examination. It is unclear if patient's "disorientation" is secondary to an underlying delirium/cognitive impairment or if she has difficulty communicating via  phone as was noted during his neuropsych testing at [facility]. Patient's disorganized behavior of wandering away from [facility] is recurrent as noted on prior ED assessments/collateral obtained from [facility]. Patient's disorganized speech marked by tangentiality is consistent with her baseline behavior as noted by staff at [facility]. Patient's symptoms may be secondary to residual symptoms from schizophrenia vs. cognitive impairment vs. delirium. In regards to schizophrenia, she is compliant with psychiatric medications/follow up with Dr. Chris. With the help of the structured living facility ([facility]), she is able to maintain her ADLs. Based on risk assessment above, she does not present with imminent risk for self injury/aggression. She thus does not warrant psychiatric admission on 9.27/9.39 basis and can should continue outpatient care with Dr. Chris. 58 year old  female, domiciled at [facility] 3, single, unemployed, no legal or substance abuse history, with past psychiatric history of schizophrenia, multiple IPP admissions (last discharged from [facility] on 8/22), no history of suicide attempts, who was brought to the ED by EMT after she was found wandering on the street (multiple such presentations to the ED; most recent 7/10/18).  Consult was placed for psychiatric evaluation. Patient was evaluated via  (see info above).    Patient has been noted to be calm and cooperative in the ED without any episodes of agitation. On evaluation patient is reports being oriented to name only. ED physician reports that the patient was oriented x3 on his examination. It is unclear if patient's "disorientation" is secondary to an underlying delirium/cognitive impairment or if she has difficulty communicating via  phone as was noted during his neuropsych testing at [facility]. Patient's disorganized behavior of wandering away from [facility] is recurrent as noted on prior ED assessments/collateral obtained from [facility]. Patient's disorganized speech marked by tangentiality is consistent with her baseline behavior as noted in prior assessments and may be exacerbated by language barrier. Patient's symptoms may be secondary to residual symptoms from schizophrenia vs. cognitive impairment vs. delirium from an underlying medical etiology. In regards to schizophrenia, she is compliant with psychiatric medications/follow up with Dr. Chris. With the help of the structured living facility ([facility]), she is able to maintain her ADLs. Based on risk assessment above, she does not present with imminent risk for self injury/aggression. She thus does not warrant psychiatric admission on 9.27/9.39 basis and can should continue outpatient care with Dr. Chris. 58 year old  female, domiciled at [facility], single, unemployed, no legal or substance abuse history, with past psychiatric history of schizophrenia, multiple IPP admissions (last discharged from [facility] on 8/22), no history of suicide attempts, who was brought to the ED by EMT after she was found wandering on the street (multiple such presentations to the ED; most recent 7/10/18).  Consult was placed for psychiatric evaluation. Patient was evaluated via  (see info above).    Patient has been noted to be calm and cooperative in the ED without any episodes of agitation. On evaluation patient is reports being oriented to name only. ED physician reports that the patient was oriented x3 on his examination. It is unclear if patient's "disorientation" is secondary to an underlying delirium/cognitive impairment or if she has difficulty communicating via  phone as was noted during his neuropsych testing at [facility]. Patient's disorganized behavior of wandering away from [facility] is recurrent as noted on prior ED assessments/collateral obtained from [facility]. Patient's disorganized speech marked by tangentiality is consistent with her baseline behavior as noted in prior assessments and may be exacerbated by language barrier. Patient's symptoms may be secondary to residual symptoms from schizophrenia vs. cognitive impairment vs. delirium from an underlying medical etiology. In regards to schizophrenia, she is compliant with psychiatric medications/follow up with Dr. Chris. With the help of the structured living facility ([facility]), she is able to maintain her ADLs.   Based on risk assessment above, she does not present with imminent risk for self injury/aggression. At this time an inpatient psychiatric hospitalization would not improve her functioning further. She should continue outpatient care with Dr. Chris.

## 2018-09-20 NOTE — ED PROVIDER NOTE - PHYSICAL EXAMINATION
Afebrile, hemodynamically stable, saturating well  NAD, well appearing  Head NCAT  EOMI, anicteric  MMM  RRR, nml S1/S2, no m/r/g  Lungs CTAB, no w/r/r  Abd soft, NT, ND, nml BS, no rebound or guarding  AAOx3, CN's 3-12 grossly intact, motor 5/5 in all extrems  Easily distracted here in ED, mood swings  JAQUEZ spontaneously, no leg cyanosis or edema  Skin warm, well perfused, no rashes or hives

## 2018-09-20 NOTE — ED BEHAVIORAL HEALTH ASSESSMENT NOTE - RISK ASSESSMENT
Maribeth has chronic elevated risk for self harm given her age, schizophrenia and cognitive impairment.  These risks, however, are mitigated by lack of past suicide attempts, lack of current suicidal ideations, medication compliance, stable housing and consistent outpatient follow up.  Patient is thus currently not considered an imminent risk for harming herself or other and does not meet criteria for involuntary psychiatric admission. Maribeth has chronic elevated risk for self harm given her age, schizophrenia and cognitive impairment.  These risks, however, are mitigated by lack of past suicide attempts, lack of current suicidal/aggressive ideations, medication compliance, stable housing and consistent outpatient follow up.  Patient is thus currently not considered an imminent risk for harming herself or other and does not meet criteria for involuntary psychiatric admission.

## 2018-09-20 NOTE — ED BEHAVIORAL HEALTH ASSESSMENT NOTE - PAST PSYCHOTROPIC MEDICATION
Risperidone 1mg qHS, Lamotrigine 200mg qHS, Olanzapine 20mg qHS, lorazepam 1.5mg TID, Sertraline 200mg Qdaily, and Paliperidone IM Suspension [id number]

## 2018-09-20 NOTE — ED BEHAVIORAL HEALTH ASSESSMENT NOTE - SUMMARY
Patient is a 58 year old  female, domiciled at [place] [facility], single, unemployed, no legal or substance abuse history, with past psychiatric history of schizophrenia, multiple IPP admissions (last discharged from [facility] on 8/22), no history of suicide attempts, who was brought to the ED by EMT after she was found wandering on the street (multiple such presentations to the ED; most recent 7/10/18).  Consult was placed for psychiatric evaluation.    On exam, patient presents with disorganized and tangential speech.  Patient is alert and oriented x 1, which per collateral is not her baseline.  Although patient's disorganized and disoriented behavior may be due to her schizophrenia,  patient is not currently endorsing any delusions or perceptual disturbances, is compliant with her medications, and does not endorse any suicidal or homicidal ideations.  Given that patient is not acutely psychotic and is compliant with her medications, patient's symptoms are not likely to resolve with an inpatient psychiatric hospitalization and other medical etiologies of her symptoms should be pursued, possible delirium or an underlying dementia, given patient's propensity for wandering and getting lost. Furthermore, patient has good follow up with her outpatient psychiatrist, has a [facility]A in process for nursing home transfer, and a pending neurological evaluation at [place].  Given all these resources in place, patient is currently safe for discharge from a psychiatric standpoint.

## 2018-09-20 NOTE — ED BEHAVIORAL HEALTH ASSESSMENT NOTE - DESCRIPTION
Patient was placed on a one to one, though remained in behavioral control; prior to psychiatric evaluation labs were drawn and CT scan was obtained. HTN, HLD, DM, osteoarthritis Patient currently living in [place] [facility]

## 2018-09-20 NOTE — ED PROVIDER NOTE - MEDICAL DECISION MAKING DETAILS
Well appearing, hemodynamically stable, afebrile. No specific symptoms today, no findings of arrhythmia, anemia, gross electrolyte abnormalities. No focal neuro deficits or CT findings, and for me was AAOx3. Noted mood swings though appears stable. Psychiatry investigated and stated baseline and safe for discharge. Discharged, patient expresses agreement with this.

## 2018-09-20 NOTE — ED BEHAVIORAL HEALTH ASSESSMENT NOTE - CURRENT MEDICATION
Olanzapine 10mg qhs, Invega Paliperidone IM 234mg qmonthly (last given 9/11/18), Gabapentin 300mg TID, and Benztropine .5mg BID

## 2018-09-21 ENCOUNTER — EMERGENCY (EMERGENCY)
Facility: HOSPITAL | Age: 58
LOS: 0 days | Discharge: LEFT BEFORE DISCHARGE | End: 2018-09-21
Admitting: EMERGENCY MEDICINE

## 2018-09-21 ENCOUNTER — EMERGENCY (EMERGENCY)
Facility: HOSPITAL | Age: 58
LOS: 0 days | Discharge: HOME | End: 2018-09-21
Attending: STUDENT IN AN ORGANIZED HEALTH CARE EDUCATION/TRAINING PROGRAM | Admitting: STUDENT IN AN ORGANIZED HEALTH CARE EDUCATION/TRAINING PROGRAM

## 2018-09-21 VITALS
TEMPERATURE: 97 F | OXYGEN SATURATION: 98 % | SYSTOLIC BLOOD PRESSURE: 132 MMHG | HEART RATE: 89 BPM | DIASTOLIC BLOOD PRESSURE: 71 MMHG | RESPIRATION RATE: 18 BRPM

## 2018-09-21 VITALS
DIASTOLIC BLOOD PRESSURE: 76 MMHG | SYSTOLIC BLOOD PRESSURE: 130 MMHG | OXYGEN SATURATION: 99 % | RESPIRATION RATE: 18 BRPM | HEART RATE: 85 BPM | TEMPERATURE: 98 F

## 2018-09-21 DIAGNOSIS — Z88.8 ALLERGY STATUS TO OTHER DRUGS, MEDICAMENTS AND BIOLOGICAL SUBSTANCES STATUS: ICD-10-CM

## 2018-09-21 DIAGNOSIS — F20.9 SCHIZOPHRENIA, UNSPECIFIED: ICD-10-CM

## 2018-09-21 DIAGNOSIS — E78.5 HYPERLIPIDEMIA, UNSPECIFIED: ICD-10-CM

## 2018-09-21 DIAGNOSIS — Z88.8 ALLERGY STATUS TO OTHER DRUGS, MEDICAMENTS AND BIOLOGICAL SUBSTANCES: ICD-10-CM

## 2018-09-21 DIAGNOSIS — Z53.21 PROCEDURE AND TREATMENT NOT CARRIED OUT DUE TO PATIENT LEAVING PRIOR TO BEING SEEN BY HEALTH CARE PROVIDER: ICD-10-CM

## 2018-09-21 DIAGNOSIS — I10 ESSENTIAL (PRIMARY) HYPERTENSION: ICD-10-CM

## 2018-09-21 DIAGNOSIS — Z79.899 OTHER LONG TERM (CURRENT) DRUG THERAPY: ICD-10-CM

## 2018-09-21 DIAGNOSIS — F99 MENTAL DISORDER, NOT OTHERWISE SPECIFIED: ICD-10-CM

## 2018-09-21 DIAGNOSIS — G89.29 OTHER CHRONIC PAIN: ICD-10-CM

## 2018-09-21 DIAGNOSIS — M25.561 PAIN IN RIGHT KNEE: ICD-10-CM

## 2018-09-21 DIAGNOSIS — E11.9 TYPE 2 DIABETES MELLITUS WITHOUT COMPLICATIONS: ICD-10-CM

## 2018-09-21 RX ORDER — IBUPROFEN 200 MG
600 TABLET ORAL ONCE
Qty: 0 | Refills: 0 | Status: COMPLETED | OUTPATIENT
Start: 2018-09-21 | End: 2018-09-21

## 2018-09-21 RX ADMIN — Medication 600 MILLIGRAM(S): at 20:23

## 2018-09-21 NOTE — ED ADULT NURSE NOTE - OBJECTIVE STATEMENT
pt was brought in by EMS for wondering around on the streets. pt denies any complaints at this time, just wants some Motrin

## 2018-09-21 NOTE — ED PROVIDER NOTE - OBJECTIVE STATEMENT
58yF Ukrainian speaking only (IID#[id number]), with PMH DM, schizophrenia, HTN, presents for medical screening eval. patient was picked up by ems on the street after getting a report of someone wandering around. no medical complaints at this time.

## 2018-09-21 NOTE — ED PROVIDER NOTE - PHYSICAL EXAMINATION
Vital Signs: I have reviewed the initial vital signs.  Constitutional: NAD, well-nourished, appears stated age, no acute distress.  HEENT: Airway patent, moist MM, no erythema/swelling/deformity of oral structures. EOMI, PERRLA.  CV: regular rate, regular rhythm, well-perfused extremities, 2+ b/l DP and radial pulses equal.  Lungs: BCTA, no increased WOB.  ABD: NTND, no guarding or rebound, no pulsatile mass, no hernias.   MSK: Neck supple, nontender, nl ROM. R knee ttp no deformity ecchymosis or swelling  INTEG: Skin warm, dry, no rash.  NEURO: A&Ox3, moving all extremities, normal speech  PSYCH: Blunted affect. no si, hi, avh
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
## 2018-09-21 NOTE — ED ADULT NURSE NOTE - NSIMPLEMENTINTERV_GEN_ALL_ED
Implemented All Universal Safety Interventions:  [place] to call system. Call bell, personal items and telephone within reach. Instruct patient to call for assistance. Room bathroom lighting operational. Non-slip footwear when patient is off stretcher. Physically safe environment: no spills, clutter or unnecessary equipment. Stretcher in lowest position, wheels locked, appropriate side rails in place.

## 2018-09-21 NOTE — ED PROVIDER NOTE - NS ED ROS FT
Review of Systems    Constitutional: (-) fever  Cardiovascular: (-) chest pain, (-) syncope  Respiratory: (-) cough, (-) shortness of breath  Gastrointestinal: (-) vomiting, (-) diarrhea, (-) abdominal pain  Musculoskeletal: (-) neck pain, (-) back pain, (+) joint pain  Integumentary: (-) rash, (-) edema  Neurological: (-) headache, (-) altered mental status  Psych: (+) schizophrenia  Except as documented in the HPI, all other systems are negative.

## 2018-09-21 NOTE — ED PROVIDER NOTE - ATTENDING CONTRIBUTION TO CARE
57 y/o F pmh schizophrenia, here for medical eval.  Pt resident at [address] [place] [address], found wondering around street.  pt had similar presentation yesterday, had extensive w/up including CTH and psyche eval.  Pt was medically cleared.  According to notes, Pt denied SI, HI, hallucinations, was taking meds.  Psyche felt sx likely 2/2 schizophrenia +/- dementia.  pt has good outpt pysche fup.  pt was cleared. Today, pt similarly has no SI, HI, or hallucinations.  She is pleasant,  AOx2 (similar to yesterday), has come chronic knee pain, o/w no somatic complaints, and atrauamtic exam.  Pt is requesting to go home.  Pt is calm and cooperative, walking in ED unassisted. This appears to be pt's baseline state, according to prior psyche notes.  Pt is stable for dc back to [facility] with outpt fup.

## 2018-09-24 DIAGNOSIS — Z02.9 ENCOUNTER FOR ADMINISTRATIVE EXAMINATIONS, UNSPECIFIED: ICD-10-CM

## 2018-10-01 ENCOUNTER — EMERGENCY (EMERGENCY)
Facility: HOSPITAL | Age: 58
LOS: 0 days | Discharge: ANOTHER HOSPITAL | End: 2018-10-02
Attending: EMERGENCY MEDICINE | Admitting: EMERGENCY MEDICINE

## 2018-10-01 VITALS
RESPIRATION RATE: 18 BRPM | SYSTOLIC BLOOD PRESSURE: 101 MMHG | TEMPERATURE: 98 F | DIASTOLIC BLOOD PRESSURE: 65 MMHG | OXYGEN SATURATION: 98 % | HEART RATE: 74 BPM

## 2018-10-01 DIAGNOSIS — Z91.14 PATIENT'S OTHER NONCOMPLIANCE WITH MEDICATION REGIMEN: ICD-10-CM

## 2018-10-01 DIAGNOSIS — Z88.8 ALLERGY STATUS TO OTHER DRUGS, MEDICAMENTS AND BIOLOGICAL SUBSTANCES STATUS: ICD-10-CM

## 2018-10-01 DIAGNOSIS — F20.9 SCHIZOPHRENIA, UNSPECIFIED: ICD-10-CM

## 2018-10-01 DIAGNOSIS — I10 ESSENTIAL (PRIMARY) HYPERTENSION: ICD-10-CM

## 2018-10-01 DIAGNOSIS — R51 HEADACHE: ICD-10-CM

## 2018-10-01 DIAGNOSIS — Z87.19 PERSONAL HISTORY OF OTHER DISEASES OF THE DIGESTIVE SYSTEM: ICD-10-CM

## 2018-10-01 DIAGNOSIS — Z79.899 OTHER LONG TERM (CURRENT) DRUG THERAPY: ICD-10-CM

## 2018-10-01 DIAGNOSIS — Z87.39 PERSONAL HISTORY OF OTHER DISEASES OF THE MUSCULOSKELETAL SYSTEM AND CONNECTIVE TISSUE: ICD-10-CM

## 2018-10-01 DIAGNOSIS — E78.5 HYPERLIPIDEMIA, UNSPECIFIED: ICD-10-CM

## 2018-10-01 DIAGNOSIS — E11.9 TYPE 2 DIABETES MELLITUS WITHOUT COMPLICATIONS: ICD-10-CM

## 2018-10-01 DIAGNOSIS — R07.9 CHEST PAIN, UNSPECIFIED: ICD-10-CM

## 2018-10-01 LAB
ALBUMIN SERPL ELPH-MCNC: 4.2 G/DL — SIGNIFICANT CHANGE UP (ref 3.5–5.2)
ALP SERPL-CCNC: 131 U/L — HIGH (ref 30–115)
ALT FLD-CCNC: 24 U/L — SIGNIFICANT CHANGE UP (ref 0–41)
ANION GAP SERPL CALC-SCNC: 14 MMOL/L — SIGNIFICANT CHANGE UP (ref 7–14)
APAP SERPL-MCNC: <5 UG/ML — LOW (ref 10–30)
AST SERPL-CCNC: 20 U/L — SIGNIFICANT CHANGE UP (ref 0–41)
BILIRUB SERPL-MCNC: 0.3 MG/DL — SIGNIFICANT CHANGE UP (ref 0.2–1.2)
BUN SERPL-MCNC: 29 MG/DL — HIGH (ref 10–20)
CALCIUM SERPL-MCNC: 10.3 MG/DL — HIGH (ref 8.5–10.1)
CHLORIDE SERPL-SCNC: 100 MMOL/L — SIGNIFICANT CHANGE UP (ref 98–110)
CO2 SERPL-SCNC: 22 MMOL/L — SIGNIFICANT CHANGE UP (ref 17–32)
CREAT SERPL-MCNC: 1.1 MG/DL — SIGNIFICANT CHANGE UP (ref 0.7–1.5)
ETHANOL SERPL-MCNC: <10 MG/DL — HIGH
GLUCOSE SERPL-MCNC: 370 MG/DL — HIGH (ref 70–99)
HCT VFR BLD CALC: 39.4 % — SIGNIFICANT CHANGE UP (ref 37–47)
HGB BLD-MCNC: 12.8 G/DL — SIGNIFICANT CHANGE UP (ref 12–16)
LIDOCAIN IGE QN: 29 U/L — SIGNIFICANT CHANGE UP (ref 7–60)
MCHC RBC-ENTMCNC: 27 PG — SIGNIFICANT CHANGE UP (ref 27–31)
MCHC RBC-ENTMCNC: 32.5 G/DL — SIGNIFICANT CHANGE UP (ref 32–37)
MCV RBC AUTO: 83.1 FL — SIGNIFICANT CHANGE UP (ref 81–99)
NRBC # BLD: 0 /100 WBCS — SIGNIFICANT CHANGE UP (ref 0–0)
PLATELET # BLD AUTO: 318 K/UL — SIGNIFICANT CHANGE UP (ref 130–400)
POTASSIUM SERPL-MCNC: 5.8 MMOL/L — HIGH (ref 3.5–5)
POTASSIUM SERPL-SCNC: 5.8 MMOL/L — HIGH (ref 3.5–5)
PROT SERPL-MCNC: 7.4 G/DL — SIGNIFICANT CHANGE UP (ref 6–8)
RBC # BLD: 4.74 M/UL — SIGNIFICANT CHANGE UP (ref 4.2–5.4)
RBC # FLD: 12.6 % — SIGNIFICANT CHANGE UP (ref 11.5–14.5)
SALICYLATES SERPL-MCNC: <0.3 MG/DL — LOW (ref 4–30)
SODIUM SERPL-SCNC: 136 MMOL/L — SIGNIFICANT CHANGE UP (ref 135–146)
WBC # BLD: 9.57 K/UL — SIGNIFICANT CHANGE UP (ref 4.8–10.8)
WBC # FLD AUTO: 9.57 K/UL — SIGNIFICANT CHANGE UP (ref 4.8–10.8)

## 2018-10-01 RX ORDER — OLANZAPINE 15 MG/1
2.5 TABLET, FILM COATED ORAL ONCE
Qty: 0 | Refills: 0 | Status: DISCONTINUED | OUTPATIENT
Start: 2018-10-01 | End: 2018-10-02

## 2018-10-01 RX ORDER — INSULIN GLARGINE 100 [IU]/ML
15 INJECTION, SOLUTION SUBCUTANEOUS AT BEDTIME
Qty: 0 | Refills: 0 | Status: DISCONTINUED | OUTPATIENT
Start: 2018-10-01 | End: 2018-10-02

## 2018-10-01 RX ORDER — BENZTROPINE MESYLATE 1 MG
0.5 TABLET ORAL
Qty: 0 | Refills: 0 | Status: DISCONTINUED | OUTPATIENT
Start: 2018-10-01 | End: 2018-10-02

## 2018-10-01 RX ORDER — ATORVASTATIN CALCIUM 80 MG/1
40 TABLET, FILM COATED ORAL AT BEDTIME
Qty: 0 | Refills: 0 | Status: DISCONTINUED | OUTPATIENT
Start: 2018-10-01 | End: 2018-10-02

## 2018-10-01 RX ORDER — AMLODIPINE BESYLATE 2.5 MG/1
5 TABLET ORAL DAILY
Qty: 0 | Refills: 0 | Status: DISCONTINUED | OUTPATIENT
Start: 2018-10-01 | End: 2018-10-02

## 2018-10-01 RX ORDER — HYDRALAZINE HCL 50 MG
10 TABLET ORAL
Qty: 0 | Refills: 0 | Status: DISCONTINUED | OUTPATIENT
Start: 2018-10-01 | End: 2018-10-02

## 2018-10-01 RX ORDER — GABAPENTIN 400 MG/1
300 CAPSULE ORAL THREE TIMES A DAY
Qty: 0 | Refills: 0 | Status: DISCONTINUED | OUTPATIENT
Start: 2018-10-01 | End: 2018-10-02

## 2018-10-01 RX ORDER — CHOLECALCIFEROL (VITAMIN D3) 125 MCG
2000 CAPSULE ORAL DAILY
Qty: 0 | Refills: 0 | Status: DISCONTINUED | OUTPATIENT
Start: 2018-10-01 | End: 2018-10-02

## 2018-10-01 RX ORDER — OLANZAPINE 15 MG/1
10 TABLET, FILM COATED ORAL AT BEDTIME
Qty: 0 | Refills: 0 | Status: DISCONTINUED | OUTPATIENT
Start: 2018-10-01 | End: 2018-10-02

## 2018-10-01 NOTE — ED PROVIDER NOTE - NS ED ROS FT
Constitutional: See HPI. No fever/chills.  Eyes: No visual changes, eye pain or discharge.  ENT: No hearing changes, pain, discharge or infections.  Neck: No neck pain or stiffness.  Cardiac: (+) CP. No SOB or edema.   Respiratory: No cough or respiratory distress. No hemoptysis.   GI: No nausea, vomiting, diarrhea or abdominal pain.  : No dysuria, frequency or burning.   MS: No myalgia, muscle weakness, joint pain or back pain.  Neuro: (+) intermittent headache. No weakness. No LOC.  Skin: No rash.  Except as documented in the HPI, all other systems are negative.

## 2018-10-01 NOTE — ED BEHAVIORAL HEALTH ASSESSMENT NOTE - PSYCHIATRIC ISSUES AND PLAN (INCLUDE STANDING AND PRN MEDICATION)
Atarax 50mg [id number] PRN agitation/anxiety, Haldol 5mg PRN q6 hours severe agitation, Atarax 50mg [id number] insomnia Atarax 50mg [id number] po PRN agitation/anxiety, Zyprexa 2.5mg IM PRN q6 hours agitation, Atarax 50mg [id number] insomnia Atarax 50mg [id number] po PRN agitation/anxiety, Zyprexa 2.5mg IM once PRN severe agitation (Pt is already taking standing zyprexa 10mg and thus, this PRN should be given with only severe agitation as pt typically should not exceed 10mg daily, Atarax 50mg [id number] insomnia
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
## 2018-10-01 NOTE — ED PROVIDER NOTE - PHYSICAL EXAMINATION
VITAL SIGNS: I have reviewed nursing notes and confirm.  CONSTITUTIONAL: Pt is well-appearing, non-toxic, in no-acute distress.  SKIN: No skin changes.  HEAD: NCAT.  EYES: PERRL, EOMI, conjunctiva and sclera clear.  ENT: MMM, OP clear, TM's clear b/l, ear canals b/l clear, no mastoid tenderness.   NECK: Supple; non tender.  CARD: S1S2 regular, RRR, 2+ radial pulses equal and symmetric b/l.  RESP: Lungs CTAB.  ABD: Soft, NT/ND, no pulsatile masses.  EXT: FROM x4.  NEURO: AAOx3, CNII-XII intact, normal speech, strength 5/5 in all extremities, sensation fully intact, reflex b/l 2+ equal x all 4 extremities.  PSYCH: Cooperative, appropriate.

## 2018-10-01 NOTE — ED BEHAVIORAL HEALTH ASSESSMENT NOTE - OTHER PAST PSYCHIATRIC HISTORY (INCLUDE DETAILS REGARDING ONSET, COURSE OF ILLNESS, INPATIENT/OUTPATIENT TREATMENT)
Patient has a past psychiatric diagnosis of schizophrenia, with multiple prior IPP admissions (most recently at [facility] 8/22), follows with psychiatrist at [place] (, [phone]).

## 2018-10-01 NOTE — ED BEHAVIORAL HEALTH ASSESSMENT NOTE - DESCRIPTION
Pt has been calm and cooperative in the ED. DM type 1, HTN, HLD, osteoarthritis Pt currently lives in [place] [facility]

## 2018-10-01 NOTE — ED BEHAVIORAL HEALTH ASSESSMENT NOTE - CASE SUMMARY
Woman with schizophrenia who presents with erratic and agitated/threatening behaviors with disorganized thought process in the setting of medication non-adherence.  The patient is currently at increased imminent risk of harm to others and meets criteria for involuntary admission for safety and treatment.

## 2018-10-01 NOTE — ED PROVIDER NOTE - OBJECTIVE STATEMENT
59 y/o F with PMHx high blood pressure and diabetes from [facility] presenting for psychiatric eval c/o CP and intermittent HA coming from b/l ears. Pt denies SIHI and is requesting to speak to psych. Pt shouting in ED about unrelated topics to her being in the ED.

## 2018-10-01 NOTE — ED BEHAVIORAL HEALTH ASSESSMENT NOTE - DETAILS
[facility], discharged on 8/22/18 Pt states she drank Clorox several year years ago because her mother was sick and she was sad. States she has a headache because 'something is wrong with her brain' States her eyes have cancer States her heart has cancer States her vaginal area is "itchy" Dr. Banegas made aware of plan to admit pt to IPP handoff provided

## 2018-10-01 NOTE — ED BEHAVIORAL HEALTH ASSESSMENT NOTE - CURRENT MEDICATION
Patient currently compliant with Olanzapine 10mg qhs, Invega Paliperidone IM 234mg qmonthly (last given 9/11/18), Gabapentin 300mg TID, and Benztropine .5mg BID.
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
## 2018-10-01 NOTE — ED BEHAVIORAL HEALTH ASSESSMENT NOTE - PAST PSYCHOTROPIC MEDICATION
Risperidone 1mg qHS, Lamotrigine 200mg qHS, Olanzapine 20mg qHS, lorazepam 1.5mg TID, Sertraline 200mg Qdaily, and Paliperidone IM Suspension [id number]

## 2018-10-02 VITALS
DIASTOLIC BLOOD PRESSURE: 68 MMHG | TEMPERATURE: 97 F | HEART RATE: 98 BPM | RESPIRATION RATE: 18 BRPM | SYSTOLIC BLOOD PRESSURE: 124 MMHG | OXYGEN SATURATION: 97 %

## 2018-10-02 LAB
CHOLEST SERPL-MCNC: 167 MG/DL — SIGNIFICANT CHANGE UP (ref 100–200)
HDLC SERPL-MCNC: 34 MG/DL — LOW
LIPID PNL WITH DIRECT LDL SERPL: 96 MG/DL — SIGNIFICANT CHANGE UP (ref 4–129)
TOTAL CHOLESTEROL/HDL RATIO MEASUREMENT: 4.9 RATIO — SIGNIFICANT CHANGE UP (ref 4–5.5)
TRIGL SERPL-MCNC: 239 MG/DL — HIGH (ref 10–149)

## 2018-10-02 RX ADMIN — GABAPENTIN 300 MILLIGRAM(S): 400 CAPSULE ORAL at 06:37

## 2018-10-02 RX ADMIN — Medication 1 TABLET(S): at 13:05

## 2018-10-02 RX ADMIN — INSULIN GLARGINE 15 UNIT(S): 100 INJECTION, SOLUTION SUBCUTANEOUS at 00:16

## 2018-10-02 RX ADMIN — Medication 2000 UNIT(S): at 13:05

## 2018-10-02 RX ADMIN — GABAPENTIN 300 MILLIGRAM(S): 400 CAPSULE ORAL at 00:15

## 2018-10-02 RX ADMIN — AMLODIPINE BESYLATE 5 MILLIGRAM(S): 2.5 TABLET ORAL at 06:37

## 2018-10-02 RX ADMIN — GABAPENTIN 300 MILLIGRAM(S): 400 CAPSULE ORAL at 13:05

## 2018-10-02 RX ADMIN — ATORVASTATIN CALCIUM 40 MILLIGRAM(S): 80 TABLET, FILM COATED ORAL at 00:15

## 2018-10-02 RX ADMIN — OLANZAPINE 10 MILLIGRAM(S): 15 TABLET, FILM COATED ORAL at 00:17

## 2018-10-02 NOTE — PROGRESS NOTE BEHAVIORAL HEALTH - DETAILS
States she has a headache because 'something is wrong with her brain' States her eyes have cancer States her heart has cancer States her vaginal area is "itchy"

## 2018-10-02 NOTE — PROGRESS NOTE BEHAVIORAL HEALTH - NSBHCONSULTMEDS_PSY_A_CORE FT
Plan: (Pt is allergic to Haldol, Clozapine and Prolixin, per [place])  #Schizophrenia  Olanzapine 10mg qhs  Gabapentin 300mg TID  Invega Sustenna 234mg [id number], next due 10-9-18 (last given 9-11-18)  Cogentin .5mg BID    #HTN  Norvasc 5mg qd  Hydralazine 10mg qd    #HLD  Atorvostatin 40mg qhs    #DM type 1  Lantus 15 units qhs  Januvia (nonformulary) 50units qd    #supplements  - Multivitamin qd  - Vitamin D3 2000 units qd

## 2018-10-02 NOTE — PROGRESS NOTE BEHAVIORAL HEALTH - NSBHFUPINTERVALHXFT_PSY_A_CORE
Patient was initially evaluated yesterday after she presented as an EDP in the setting of agitation that began in her residence and then continued in a public setting.  The patient was found to be thought disorganized with periodic and unpredictable agitation and threatening behaviors.  Collateral reported her agitation and thought disorganization to have been recently increasing over the past week in the setting of non-adherence to medication.  The patient was admitted and is awaiting an inpatient psychiatric bed.  Her medications have been restarted.  She is due for Invega injection on 10/9.    Upon assessment today the patient was calmer, but had periods of unpredictable agitation, raising her voice with pressured speech and disorganized thoughts.  Constant observation reported these periods of erratic behavior being observed throughout the night and morning.

## 2018-10-02 NOTE — PROGRESS NOTE BEHAVIORAL HEALTH - SUMMARY
58 year old woman with schizophrenia domiciled at [facility] who presented as EDP and has been observed to have a disorganized thought process with erratic and unpredictable agitated and threatening behaviors.  She is currently restarted on her medication and awaiting an inpatient bed

## 2018-10-04 NOTE — ED ADULT TRIAGE NOTE - TEMPERATURE IN CELSIUS (DEGREES C)
Complex Repair And O-T Advancement Flap Text: The defect edges were debeveled with a #15 scalpel blade.  The primary defect was closed partially with a complex linear closure.  Given the location of the remaining defect, shape of the defect and the proximity to free margins an O-T advancement flap was deemed most appropriate for complete closure of the defect.  Using a sterile surgical marker, an appropriate advancement flap was drawn incorporating the defect and placing the expected incisions within the relaxed skin tension lines where possible.    The area thus outlined was incised deep to adipose tissue with a #15 scalpel blade.  The skin margins were undermined to an appropriate distance in all directions utilizing iris scissors. 36.3

## 2018-11-30 NOTE — ED BEHAVIORAL HEALTH ASSESSMENT NOTE - MARITAL STATUS
Reason For Visit  NATHAN SEE is an established patient here today for a follow-up management of DM/ RT Great Toe Infection. Eye referral given 10/13/2018.   :  services not used.   NATHAN SEE accepted a chaperone. She is unaccompanied.        Quality    Adult Wellness CI not using alcohol, no tobacco use, pap smear performed: 01/25/2014, does not have feelings of hopelessness (PHQ-2), no Anhedonia (PHQ-2), not referred to local mental health center, not taking medication for depression, monitoring patient, preventive medicine therapy for influenza, preventive medicine therapy for pneumococcal, not taking aspirin and discussion of risks and benefits of Aspirin.   Diabetes CI height documented, no tobacco use, does not have feelings of hopelessness (PHQ-2), no anhedonia (PHQ-2), not referred to local mental health center, not taking medication for depression, monitoring patient, a foot inspection performed, preventative medicine test results documented/reviewed for Hemoglobin A1c, preventive medicine results documented/reviewed for Microalbuminuria, ACE inhibitor therapy prescribed and angiotensin receptor blocker not prescribed.   Smoking Cessation CI no tobacco use.      History of Present Illness  Patient is here for excision of right foot callus right at MTP joint and debridement of nails. Blood sugar somewhat better since she has been taking Humalog 25 units in each meals along with Byetta pioglitazone.        Review of Systems    Const: Normal.   Skin:. Per HPI.       Allergies  No Known Drug Allergies    Current Meds   1. Accu-Chek Compact Test Drum STRP;   Therapy: 28Aug2010 to  Requested for: 14Oct2018 Recorded   2. Accu-Chek Softclix Lancets;   Therapy: 28Aug2010 to  Requested for: 14Oct2018 Recorded   3. Aspirin 81 MG TABS; TAKE 1 TABLET DAILY;   Therapy: 28Nov2010 to  Requested for: 14Oct2018 Recorded   4. Atorvastatin Calcium 10 MG Oral Tablet; TAKE 1 TABLET BY MOUTH  EVERY DAY;   Therapy: 01Mxf1412 to (Evaluate:19Jun2018)  Requested for: 89Gmu9061; Last   Rx:[id number] Ordered   5. BD Pen Needle Mini U/F 31G X 5 MM;   Therapy: 43Wiy6091 to  Requested for: 14Oct2018 Recorded   6. BD Pen Needle Cat U/F 32G X 4 MM; USE 4 TIMES DAILY;   Therapy: 39Hry5636 to (Evaluate:04Jan2014)  Requested for: 33Lma8595; Last   Rx:[id number] Ordered   7. Byetta 10 MCG Pen 10 MCG/0.04ML Subcutaneous Solution Pen-injector; INJECT 10   MCG UNDER THE SKIN TWICE DAILY;   Therapy: 03Jan2015 to (Evaluate:03Jan2018)  Requested for: 08Jan2017; Last   Rx:08Jan2017 Ordered   8. Byetta 5 MCG Pen 5 MCG/0.02ML Subcutaneous Solution Pen-injector; See AUX Label;   Therapy: 24Apr2016 to (Evaluate:73Xgs0599)  Requested for: 03May2016; Last   Rx:03May2016 Ordered   9. Enalapril Maleate 5 MG Oral Tablet; take one tablet by mouth every day;   Therapy: 03Jan2015 to (Evaluate:54Bcn4033)  Requested for: 03Jan2015; Last   Rx:03Jan2015 Ordered   10. HumaLOG KwikPen 100 UNIT/ML Subcutaneous Solution Pen-injector; INJECT 23 UNIT    3 TIMES DAILY;    Therapy: 21Mar2018 to (Evaluate:96Dgv0173)  Requested for: 07Tqg0558; Last    Rx:[id number] Ordered   11. Losartan Potassium 25 MG Oral Tablet; TAKE 1 TABLET DAILY;    Therapy: 12Hwg6005 to (Evaluate:37Urc3730)  Requested for: 89Igo4203; Last    Rx:31Qzm0260 Ordered   12. Pen Needles 31G X 6 MM; USE AS DIRECTED;    Therapy: 06Jun2015 to (Evaluate:49Bcy8596); Last Rx:26Ujc3429 Ordered   13. 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.09)  Fall, initial encounter (W19.XXXA)  Hyperlipidemia (E78.5)  Injury of left knee, initial encounter (S89.92XA)  Microalbuminuria (R80.9)  Need for hepatitis A vaccination (Z23)  Need for pneumococcal vaccination (Z23)  Noncompliance of patient with other medical treatment and regimen (Z91.19)  Normal routine physical examination (Z00.00)  Not Getting Tests Done  Onychomycosis of toenail (B35.1)  Skin rash (R21)  Tooth abscess (K04.7)  Travel advice encounter (Z71.89)  Type 2 diabetes mellitus, uncontrolled (E11.65)  Type 2 diabetes, uncontrolled, with renal manifestation (E11.29,E11.65)  Vaginal itching (N89.8)    Past Medical History  Denied: History of Anemia  Denied: History of Asthma  Denied: History of Atrial Fibrillation  Denied: History of Breast Cancer  Denied: History of Colon Cancer  Denied: History of Depression  Encounter for general health examination (Z00.00)  Denied: History of Hypertension  History of Impaired fasting glucose (R73.01)   · DM  Need for hepatitis A vaccination (Z23)  History of Nephrolithiasis (N20.0)    Surgical History  History of  Section  History of Nephrectomy   · R nephrectomy for huge stone ;  History of Nephrectomy Right  History of Surgery    Family History  Family History   Family history of Breast Cancer   · mom 32 yo alive 70's  Denied: Family history of Colon Cancer  Family history of Coronary Artery Disease   · dad 79 yo;brod  Family history of Diabetes Mellitus   · self; brod,sis;dad  Family history of hypertension (Z82.49)   · brod  Denied: Family history of Ovarian Cancer  Denied: Family history of Skin Cancer  Family history of Stroke syndrome   · brod 40's    Social History  Denied: Alcohol  Denied: Alcohol  Never smoker    Review  Past medical history and problem list reviewed.      Vitals  Signs   Recorded: 2018 09:21AM   Height: 5 ft 5 in  Weight: 199 lb   BMI Calculated: 33.12  BSA  Calculated: 1.97  Systolic - Sittin, RUE  Diastolic - Sittin, RUE  Temperature: 97.6 F  Heart Rate: 82  Respiration: 17  O2 Saturation: 100    Physical Exam  Constitutional: alert, in no acute distress and current vital signs reviewed.   Skin, Hair, Nails: . (Medial side of the right first MTP joint remarkable for thick callus).  (Deformed thick long toenails).      Immunizations  FLU (SPLT PF) --- Series1: 2010   Fluvirin Preservative Free 0.5 ML Intramuscular Suspension Prefilled Syringe --- Series1:  06-Oct-2018   Hepatitis A --- Series1: 28-Mar-2011; Series2: 2014   Influenza --- Series1: 01-Oct-2013; Series2: 01-Oct-2014; Series3: 05-Oct-2017   PCV --- Series1: 2016   PPSV --- Series1: 13-Oct-2018   Yellow Fever --- Series1: 2010     Assessment  Callus (L84)  Type 2 diabetes, uncontrolled, with renal manifestation (E11.29,E11.65)  Onychomycosis of toenail (B35.1)    Plan  Podiatry Referral/Consult Treatment and Evaluation  right big toe callus  Status: Active   Requested for: 2018  Care Summary provided. : Yes  Plans:     Plan:   10 toenails debrided and right foot callus excised with 15 blade knife.  Patient has a small open wound underneath the callus, referred to podiatrist for further management.  Uncontrolled diabetes with nephropathy, continue Humalog sliding scale with each meals along with pioglitazone.  Blood test in couple months  .      Signatures   Electronically signed by : MARC Zendejas; Nov  3 2018  9:25AM CST    Electronically signed by : MICHAEL IRWIN M.D.; Nov  3 2018  3:33PM CST     Single

## 2018-12-11 NOTE — ED ADULT NURSE NOTE - CHPI ED SYMPTOMS NEG
no change in level of consciousness/no suicidal/no paranoia/no agitation/no confusion/no weight loss/no disorientation/no homicidal/no weakness/no hallucinations
none

## 2018-12-12 ENCOUNTER — OUTPATIENT (OUTPATIENT)
Dept: OUTPATIENT SERVICES | Facility: HOSPITAL | Age: 58
LOS: 1 days | Discharge: HOME | End: 2018-12-12

## 2018-12-12 DIAGNOSIS — F20.9 SCHIZOPHRENIA, UNSPECIFIED: ICD-10-CM

## 2018-12-12 DIAGNOSIS — Z79.899 OTHER LONG TERM (CURRENT) DRUG THERAPY: ICD-10-CM

## 2019-02-22 NOTE — ED BEHAVIORAL HEALTH ASSESSMENT NOTE - TIME CONSULT PERFORMED
----- Message from Tommy Freeman sent at 2/21/2019 12:51 PM CST -----  Contact: Patient  Type: Needs Medical Advice    Who Called:  Patient  Best Call Back Number: [phone]  Additional Information: Patient states that right foot is swelling at random times. Please call to advise. Thanks!  
Random swelling likely is not something the Lasix helps.  How often does it swell.  How much does it swell.   May need to be seen.  
Spoke with pt and informed her that per Dr. Moe, random swelling likely isn't something that lasix helps and that she may need to be seen. Pt verbalized understanding and said she will call back to schedule an appt.  
Spoke with pt. She said her right foot swells every couple of days. She said she is taking lasix 40mg bid already. She wants to know if you want her to increase to 2.5 pills a day. Does she need to be seen? Please advise.  
01-Oct-2018 13:21

## 2019-05-23 NOTE — ED PROVIDER NOTE - TOBACCO USE
Vision has been seen per Dr. Belen Patel since Dr. Lee Current left. She has been on an exercise regimen but unable to tolerate most of the exercise therapies. She is advised to see neurology for an evaluation–Dr. Jordon Wheeler  in [facility].   Referral has been generated Never smoker

## 2019-06-01 NOTE — ED PROVIDER NOTE - NSCAREINITIATED _GEN_ER
Offered patient a warm washcloth to wash her hands and face. Patient refused.       Edmar Mary RN  06/01/19 [id number] Kinsey García(Attending)

## 2019-06-12 ENCOUNTER — OUTPATIENT (OUTPATIENT)
Dept: OUTPATIENT SERVICES | Facility: HOSPITAL | Age: 59
LOS: 1 days | Discharge: HOME | End: 2019-06-12

## 2019-06-12 DIAGNOSIS — F20.9 SCHIZOPHRENIA, UNSPECIFIED: ICD-10-CM

## 2019-06-12 DIAGNOSIS — Z79.899 OTHER LONG TERM (CURRENT) DRUG THERAPY: ICD-10-CM

## 2020-03-26 NOTE — ED ADULT TRIAGE NOTE - PRO INTERPRETER NEED 2
English
Quality 130: Documentation Of Current Medications In The Medical Record: Current Medications Documented
Quality 226: Preventive Care And Screening: Tobacco Use: Screening And Cessation Intervention: Patient screened for tobacco use and is an ex/non-smoker
Quality 47: Advance Care Plan: Advance Care Planning discussed and documented; advance care plan or surrogate decision maker documented in the medical record.
Detail Level: Detailed

## 2020-05-18 NOTE — ED PROVIDER NOTE - NS_EDPROVIDERDISPOUSERTYPE_ED_A_ED
Quality 47: Advance Care Plan: Advance Care Planning discussed and documented in the medical record; patient did not wish or was not able to name a surrogate decision maker or provide an advance care plan. Quality 226: Preventive Care And Screening: Tobacco Use: Screening And Cessation Intervention: Patient screened for tobacco use and is an ex/non-smoker Quality 111:Pneumonia Vaccination Status For Older Adults: Pneumococcal Vaccination not Administered or Previously Received, Reason not Otherwise Specified Quality 154 Part B: Falls: Risk Screening (Should Be Reported With Measure 155.): Patient screened for future fall risk; documentation of no falls in the past year or only one fall without injury in the past year Attending Attestation (For Attendings USE Only)... Quality 155 (Denominator): Falls Plan Of Care: Plan of Care not Documented, Reason not Otherwise Specified Quality 131: Pain Assessment And Follow-Up: Pain assessment using a standardized tool is documented as negative, no follow-up plan required Detail Level: Detailed Quality 431: Preventive Care And Screening: Unhealthy Alcohol Use - Screening: Patient screened for unhealthy alcohol use using a single question and scores less than 2 times per year Quality 154 Part A: Falls: Risk Assessment (Should Be Reported With Measure 155.): Falls risk assessment completed and documented in the past 12 months. Quality 110: Preventive Care And Screening: Influenza Immunization: Influenza Immunization Ordered or Recommended, but not Administered due to system reason

## 2021-05-04 NOTE — ED BEHAVIORAL HEALTH ASSESSMENT NOTE - NS ED BHA DEMOGRAPHICS NEED LANGUAGE ASSISTANCE
Called by Radiology with results of CT chest. Patchy left lower lobe opacity in the paraspinal location is new when compared to prior study 3/21/2021; peripheral right lower and upper nodules are also new, and these findings are concerning for new foci of infection. Recommend a 3 month follow-up to assess resolution. More extensive patchy and nodular opacities seen on 3/21/2021 have significantly improved or resolved. Results d/w attending Dr. Rich. Hold off on antibiotics for now. Pulmonary consulted. Will monitor. No

## 2021-07-19 NOTE — ED BEHAVIORAL HEALTH ASSESSMENT NOTE - SUMMARY
none Pt is a 58 y/o  female with known history of schizophrenia with past psychiatric hospitalizations, domiciled at [facility], brought by EMS [place] told EMS she ran away from [place]. On evaluation, patient is disorganized in thought process but calm and cooperative. Per collateral, she has AH and delusions at baseline, however she is compliant with medications and in behavioral control. As patient appears at her baseline and is not showing symptoms of acute psychosis, mark or depression, there is not indication for admission to [facility].

## 2021-09-01 NOTE — ED BEHAVIORAL HEALTH ASSESSMENT NOTE - NS ED BHA ED COURSE UTILIZATION OF 1 TO 1 IN ED YN
Called for records   MRY:[id number]  KRE:[id number]  EXP:04/06/2023  SITE: right [email or web address]  PATIENT TOLERATED WELL Yes

## 2023-08-08 NOTE — ED BEHAVIORAL HEALTH ASSESSMENT NOTE - DESCRIPTION
Refill approved as requested.   Calm, uncooperative with physical exam, in behavioral control Constipation, D.M., HTN, Hyperlipidemia, Osteoarthritis [place]oved [place] from [place] 3 years ago. Pt currently resides at [place] [address]

## 2023-12-19 NOTE — ED BEHAVIORAL HEALTH ASSESSMENT NOTE - INSIGHT (REGARDING PSYCHIATRIC ILLNESS)
URI:   - patient has upper respiratory infection for last several days, worse yesterday.   was positive with COVID, but patient swabs negative for COVID twice a day home and once in clinic today.  On exam patient has lesions to right tonsillar pillar, has palpable lymph node to right cervical chain.   - giving Celestone shot today.  Giving Augmentin for next 5 days twice a day.   - writing for Tessalon Perles as well as codeine cough syrup to help with cough.   - advise keeping well hydrated, getting plenty of sleep.  
Poor

## 2024-02-12 NOTE — ED ADULT NURSE NOTE - RN DISCHARGE SIGNATURE
[FreeTextEntry1] : Patient with hypercoagulability , HLD and HTN.  eEnalapril recently increased for BP control.  /80 in the office, 130/80 at home  Triglycerides elevated, LDL in good range.
08-May-2018

## 2025-06-02 NOTE — ED PROVIDER NOTE - DISCHARGE DATE
Outpatient Physical Therapy        [] Phone: [phone]   Fax: [phone]  Physician:          From: Joel Morin, PT,     Patient: Layla Pryor                    : 13-Mar-2018

## 2025-07-23 NOTE — ED PROVIDER NOTE - NS ED ROS FT
Eyes:  No visual changes, eye pain or discharge.  ENMT:  no sore throat or runny nose  Cardiac:  No chest pain, SOB   Respiratory:  No cough or respiratory distress  GI:  No nausea, vomiting, diarrhea or abdominal pain.  :  No dysuria, frequency or burning.  MS:  No joint pain or back pain.  Neuro:  No headache   Skin:  No skin rash.   Endocrine: No history of thyroid disease or diabetes.
by name